# Patient Record
Sex: MALE | Race: WHITE | NOT HISPANIC OR LATINO | URBAN - METROPOLITAN AREA
[De-identification: names, ages, dates, MRNs, and addresses within clinical notes are randomized per-mention and may not be internally consistent; named-entity substitution may affect disease eponyms.]

---

## 2023-10-02 ENCOUNTER — TELEPHONE (OUTPATIENT)
Dept: PRIMARY CARE | Facility: CLINIC | Age: 68
End: 2023-10-02
Payer: MEDICARE

## 2023-10-02 DIAGNOSIS — Z00.00 HEALTHCARE MAINTENANCE: Primary | ICD-10-CM

## 2023-10-03 NOTE — TELEPHONE ENCOUNTER
Called and ANTONIETTA for jitendra. I also contacted the lab department for an estimate price for the order that was placed, they mentioned it could be about $20 in total but if it was the full blood type screening it'll be more expensive

## 2023-10-11 ENCOUNTER — LAB (OUTPATIENT)
Dept: LAB | Facility: LAB | Age: 68
End: 2023-10-11
Payer: MEDICARE

## 2023-10-11 DIAGNOSIS — Z00.00 HEALTHCARE MAINTENANCE: ICD-10-CM

## 2023-10-11 LAB
ABO GROUP (TYPE) IN BLOOD: NORMAL
RH FACTOR (ANTIGEN D): NORMAL

## 2023-10-11 PROCEDURE — 86901 BLOOD TYPING SEROLOGIC RH(D): CPT

## 2023-10-11 PROCEDURE — 86900 BLOOD TYPING SEROLOGIC ABO: CPT

## 2023-10-11 PROCEDURE — 36415 COLL VENOUS BLD VENIPUNCTURE: CPT

## 2023-12-20 DIAGNOSIS — L30.9 DERMATITIS, UNSPECIFIED: ICD-10-CM

## 2023-12-20 PROBLEM — R79.89 ABNORMAL THYROID BLOOD TEST: Status: ACTIVE | Noted: 2023-12-20

## 2023-12-20 PROBLEM — E66.3 OVERWEIGHT (BMI 25.0-29.9): Status: ACTIVE | Noted: 2023-12-20

## 2023-12-20 PROBLEM — L25.9 CONTACT DERMATITIS: Status: ACTIVE | Noted: 2023-12-20

## 2023-12-20 PROBLEM — B02.9 SHINGLES: Status: ACTIVE | Noted: 2023-12-20

## 2023-12-20 PROBLEM — E78.00 ELEVATED LDL CHOLESTEROL LEVEL: Status: ACTIVE | Noted: 2023-12-20

## 2023-12-20 PROBLEM — K21.9 GASTROESOPHAGEAL REFLUX DISEASE: Status: ACTIVE | Noted: 2023-12-20

## 2023-12-20 PROBLEM — Z20.822 SUSPECTED COVID-19 VIRUS INFECTION: Status: ACTIVE | Noted: 2023-12-20

## 2023-12-20 PROBLEM — I65.29 OCCLUSION AND STENOSIS OF UNSPECIFIED CAROTID ARTERY: Status: ACTIVE | Noted: 2023-12-20

## 2023-12-20 PROBLEM — R21 LOCALIZED RASH: Status: ACTIVE | Noted: 2023-12-20

## 2023-12-20 PROBLEM — I25.10 CAD (CORONARY ARTERY DISEASE): Status: ACTIVE | Noted: 2023-12-20

## 2023-12-20 PROBLEM — R73.9 HYPERGLYCEMIA: Status: ACTIVE | Noted: 2023-12-20

## 2023-12-20 PROBLEM — R97.20 ELEVATED PSA: Status: ACTIVE | Noted: 2023-12-20

## 2023-12-20 PROBLEM — F17.210 NICOTINE DEPENDENCE, CIGARETTES, UNCOMPLICATED: Status: ACTIVE | Noted: 2023-12-20

## 2023-12-20 PROBLEM — N40.0 BPH (BENIGN PROSTATIC HYPERPLASIA): Status: ACTIVE | Noted: 2023-12-20

## 2023-12-20 PROBLEM — R11.2 NAUSEA WITH VOMITING: Status: ACTIVE | Noted: 2023-12-20

## 2023-12-20 RX ORDER — DOXAZOSIN 4 MG/1
TABLET ORAL
COMMUNITY
Start: 2019-10-20 | End: 2024-01-03 | Stop reason: SDUPTHER

## 2023-12-20 RX ORDER — DICYCLOMINE HYDROCHLORIDE 20 MG/1
TABLET ORAL 3 TIMES DAILY PRN
COMMUNITY
Start: 2020-05-12 | End: 2024-01-11

## 2023-12-20 RX ORDER — SIMVASTATIN 80 MG/1
TABLET, FILM COATED ORAL
COMMUNITY
Start: 2019-11-16 | End: 2024-01-03 | Stop reason: SDUPTHER

## 2023-12-20 RX ORDER — ASPIRIN 81 MG/1
TABLET ORAL
COMMUNITY

## 2023-12-20 RX ORDER — DESOXIMETASONE 2.5 MG/G
1 CREAM TOPICAL 2 TIMES DAILY
COMMUNITY
End: 2023-12-22 | Stop reason: WASHOUT

## 2023-12-20 RX ORDER — ONDANSETRON 4 MG/1
TABLET, ORALLY DISINTEGRATING ORAL EVERY 6 HOURS PRN
COMMUNITY
Start: 2020-05-12 | End: 2024-01-11

## 2023-12-20 RX ORDER — TRIAMCINOLONE ACETONIDE 1 MG/G
CREAM TOPICAL
COMMUNITY
End: 2024-01-11

## 2023-12-22 RX ORDER — DESOXIMETASONE 2.5 MG/G
1 CREAM TOPICAL 2 TIMES DAILY
Qty: 60 G | Refills: 2 | Status: SHIPPED | OUTPATIENT
Start: 2023-12-22 | End: 2024-01-11 | Stop reason: WASHOUT

## 2024-01-03 DIAGNOSIS — N40.1 BENIGN PROSTATIC HYPERPLASIA WITH LOWER URINARY TRACT SYMPTOMS, SYMPTOM DETAILS UNSPECIFIED: ICD-10-CM

## 2024-01-03 DIAGNOSIS — E78.00 ELEVATED LDL CHOLESTEROL LEVEL: Primary | ICD-10-CM

## 2024-01-03 RX ORDER — SIMVASTATIN 80 MG/1
80 TABLET, FILM COATED ORAL DAILY
Qty: 90 TABLET | Refills: 3 | Status: SHIPPED | OUTPATIENT
Start: 2024-01-03 | End: 2025-01-02

## 2024-01-03 RX ORDER — DOXAZOSIN 4 MG/1
4 TABLET ORAL DAILY
Qty: 90 TABLET | Refills: 3 | Status: SHIPPED | OUTPATIENT
Start: 2024-01-03 | End: 2025-01-02

## 2024-01-11 ENCOUNTER — LAB (OUTPATIENT)
Dept: LAB | Facility: LAB | Age: 69
End: 2024-01-11
Payer: MEDICARE

## 2024-01-11 ENCOUNTER — OFFICE VISIT (OUTPATIENT)
Dept: PRIMARY CARE | Facility: CLINIC | Age: 69
End: 2024-01-11
Payer: MEDICARE

## 2024-01-11 VITALS
BODY MASS INDEX: 28.12 KG/M2 | SYSTOLIC BLOOD PRESSURE: 132 MMHG | OXYGEN SATURATION: 95 % | HEART RATE: 71 BPM | WEIGHT: 201.6 LBS | DIASTOLIC BLOOD PRESSURE: 74 MMHG

## 2024-01-11 DIAGNOSIS — Z12.5 PROSTATE CANCER SCREENING: ICD-10-CM

## 2024-01-11 DIAGNOSIS — Z11.59 ENCOUNTER FOR HEPATITIS C SCREENING TEST FOR LOW RISK PATIENT: ICD-10-CM

## 2024-01-11 DIAGNOSIS — E78.5 HYPERLIPIDEMIA, UNSPECIFIED HYPERLIPIDEMIA TYPE: ICD-10-CM

## 2024-01-11 DIAGNOSIS — Z00.00 ANNUAL PHYSICAL EXAM: Primary | ICD-10-CM

## 2024-01-11 DIAGNOSIS — G47.00 INSOMNIA, UNSPECIFIED TYPE: ICD-10-CM

## 2024-01-11 DIAGNOSIS — Z87.891 FORMER SMOKER: ICD-10-CM

## 2024-01-11 DIAGNOSIS — Z00.00 ANNUAL PHYSICAL EXAM: ICD-10-CM

## 2024-01-11 DIAGNOSIS — Z79.899 HIGH RISK MEDICATION USE: ICD-10-CM

## 2024-01-11 LAB
ALBUMIN SERPL BCP-MCNC: 4.5 G/DL (ref 3.4–5)
ALP SERPL-CCNC: 71 U/L (ref 33–136)
ALT SERPL W P-5'-P-CCNC: 20 U/L (ref 10–52)
ANION GAP SERPL CALC-SCNC: 11 MMOL/L (ref 10–20)
APPEARANCE UR: CLEAR
AST SERPL W P-5'-P-CCNC: 16 U/L (ref 9–39)
BILIRUB SERPL-MCNC: 0.7 MG/DL (ref 0–1.2)
BILIRUB UR STRIP.AUTO-MCNC: NEGATIVE MG/DL
BUN SERPL-MCNC: 14 MG/DL (ref 6–23)
CALCIUM SERPL-MCNC: 10.3 MG/DL (ref 8.6–10.6)
CHLORIDE SERPL-SCNC: 102 MMOL/L (ref 98–107)
CHOLEST SERPL-MCNC: 114 MG/DL (ref 0–199)
CHOLESTEROL/HDL RATIO: 2
CO2 SERPL-SCNC: 31 MMOL/L (ref 21–32)
COLOR UR: YELLOW
CREAT SERPL-MCNC: 0.82 MG/DL (ref 0.5–1.3)
EGFRCR SERPLBLD CKD-EPI 2021: >90 ML/MIN/1.73M*2
ERYTHROCYTE [DISTWIDTH] IN BLOOD BY AUTOMATED COUNT: 12.9 % (ref 11.5–14.5)
EST. AVERAGE GLUCOSE BLD GHB EST-MCNC: 114 MG/DL
GLUCOSE SERPL-MCNC: 108 MG/DL (ref 74–99)
GLUCOSE UR STRIP.AUTO-MCNC: NEGATIVE MG/DL
HBA1C MFR BLD: 5.6 %
HCT VFR BLD AUTO: 44.7 % (ref 41–52)
HCV AB SER QL: NONREACTIVE
HDLC SERPL-MCNC: 56.4 MG/DL
HGB BLD-MCNC: 15 G/DL (ref 13.5–17.5)
KETONES UR STRIP.AUTO-MCNC: NEGATIVE MG/DL
LDLC SERPL CALC-MCNC: 46 MG/DL
LEUKOCYTE ESTERASE UR QL STRIP.AUTO: NEGATIVE
MCH RBC QN AUTO: 30.9 PG (ref 26–34)
MCHC RBC AUTO-ENTMCNC: 33.6 G/DL (ref 32–36)
MCV RBC AUTO: 92 FL (ref 80–100)
NITRITE UR QL STRIP.AUTO: NEGATIVE
NON HDL CHOLESTEROL: 58 MG/DL (ref 0–149)
NRBC BLD-RTO: 0 /100 WBCS (ref 0–0)
PH UR STRIP.AUTO: 7 [PH]
PLATELET # BLD AUTO: 213 X10*3/UL (ref 150–450)
POTASSIUM SERPL-SCNC: 4 MMOL/L (ref 3.5–5.3)
PROT SERPL-MCNC: 7.6 G/DL (ref 6.4–8.2)
PROT UR STRIP.AUTO-MCNC: NEGATIVE MG/DL
PSA SERPL-MCNC: 4.38 NG/ML
RBC # BLD AUTO: 4.86 X10*6/UL (ref 4.5–5.9)
RBC # UR STRIP.AUTO: NEGATIVE /UL
SODIUM SERPL-SCNC: 140 MMOL/L (ref 136–145)
SP GR UR STRIP.AUTO: 1.01
TRIGL SERPL-MCNC: 60 MG/DL (ref 0–149)
UROBILINOGEN UR STRIP.AUTO-MCNC: <2 MG/DL
VLDL: 12 MG/DL (ref 0–40)
WBC # BLD AUTO: 5 X10*3/UL (ref 4.4–11.3)

## 2024-01-11 PROCEDURE — 80061 LIPID PANEL: CPT

## 2024-01-11 PROCEDURE — 1159F MED LIST DOCD IN RCRD: CPT | Performed by: FAMILY MEDICINE

## 2024-01-11 PROCEDURE — 99213 OFFICE O/P EST LOW 20 MIN: CPT | Performed by: FAMILY MEDICINE

## 2024-01-11 PROCEDURE — 83036 HEMOGLOBIN GLYCOSYLATED A1C: CPT

## 2024-01-11 PROCEDURE — G0439 PPPS, SUBSEQ VISIT: HCPCS | Performed by: FAMILY MEDICINE

## 2024-01-11 PROCEDURE — 80053 COMPREHEN METABOLIC PANEL: CPT

## 2024-01-11 PROCEDURE — G0103 PSA SCREENING: HCPCS

## 2024-01-11 PROCEDURE — 1170F FXNL STATUS ASSESSED: CPT | Performed by: FAMILY MEDICINE

## 2024-01-11 PROCEDURE — 86803 HEPATITIS C AB TEST: CPT

## 2024-01-11 PROCEDURE — 1126F AMNT PAIN NOTED NONE PRSNT: CPT | Performed by: FAMILY MEDICINE

## 2024-01-11 PROCEDURE — 85027 COMPLETE CBC AUTOMATED: CPT

## 2024-01-11 PROCEDURE — 81003 URINALYSIS AUTO W/O SCOPE: CPT

## 2024-01-11 PROCEDURE — 36415 COLL VENOUS BLD VENIPUNCTURE: CPT

## 2024-01-11 RX ORDER — AMITRIPTYLINE HYDROCHLORIDE 10 MG/1
10-20 TABLET, FILM COATED ORAL NIGHTLY
Qty: 60 TABLET | Refills: 11 | Status: SHIPPED | OUTPATIENT
Start: 2024-01-11 | End: 2025-01-10

## 2024-01-11 RX ORDER — HYDROCORTISONE 25 MG/G
CREAM TOPICAL 2 TIMES DAILY
COMMUNITY

## 2024-01-11 ASSESSMENT — PATIENT HEALTH QUESTIONNAIRE - PHQ9
1. LITTLE INTEREST OR PLEASURE IN DOING THINGS: NOT AT ALL
2. FEELING DOWN, DEPRESSED OR HOPELESS: NOT AT ALL
SUM OF ALL RESPONSES TO PHQ9 QUESTIONS 1 AND 2: 0

## 2024-01-11 ASSESSMENT — ACTIVITIES OF DAILY LIVING (ADL)
MANAGING_FINANCES: INDEPENDENT
DOING_HOUSEWORK: INDEPENDENT
TAKING_MEDICATION: INDEPENDENT
GROCERY_SHOPPING: INDEPENDENT
BATHING: INDEPENDENT
DRESSING: INDEPENDENT

## 2024-01-11 ASSESSMENT — ENCOUNTER SYMPTOMS
DEPRESSION: 0
OCCASIONAL FEELINGS OF UNSTEADINESS: 0
LOSS OF SENSATION IN FEET: 0

## 2024-01-11 ASSESSMENT — PAIN SCALES - GENERAL: PAINLEVEL: 0-NO PAIN

## 2024-01-11 NOTE — PROGRESS NOTES
Subjective   Patient ID: Bebo Casillas is a 68 y.o. male who presents for Medicare Annual Wellness Visit Subsequent (Medicare wellness exam, pt is fasting. ) and Annual Exam (Annual physical exam. ).    HPI   Patient here for annual checkup.  He is doing well.  He is exercising regularly.  No complaints other than wants to discuss knee pain.  He is using 2 Benadryl at night with pretty good success.      Review of Systems    Objective   /74 (BP Location: Left arm, Patient Position: Sitting, BP Cuff Size: Adult)   Pulse 71   Wt 91.4 kg (201 lb 9.6 oz)   SpO2 95%   BMI 28.12 kg/m²     Physical Exam  Alert, pleasant and in no acute distress.  Neck: Supple, no JVD or carotid bruit  Heart: Regular rate and rhythm, no murmur  Lungs: Clear to auscultation  Lower extremities: No edema  Abdomen: Soft, nontender without palpable mass    Assessment/Plan   Problem List Items Addressed This Visit    None  Visit Diagnoses         Codes    Annual physical exam    -  Primary Z00.00    Relevant Orders    Hepatitis C antibody    Comprehensive Metabolic Panel    CBC    Lipid Panel    Prostate Specific Antigen, Screen    Urinalysis with Reflex Microscopic    Hemoglobin A1C    Insomnia, unspecified type     G47.00    Relevant Medications    amitriptyline (Elavil) 10 mg tablet    Hyperlipidemia, unspecified hyperlipidemia type     E78.5    Relevant Orders    Lipid Panel    Former smoker     Z87.891    Relevant Orders    CT lung screening low dose    High risk medication use     Z79.899    Relevant Orders    Comprehensive Metabolic Panel    CBC    Prostate cancer screening     Z12.5    Relevant Orders    Prostate Specific Antigen, Screen    Encounter for hepatitis C screening test for low risk patient     Z11.59    Relevant Orders    Hepatitis C antibody        1.  Health maintenance: Care gaps addressed.  Hepatitis C and PSA screenings.  Hemoglobin A1c (elevated blood sugar on past chemistries).  Discussed healthy diet and  avoiding high carbs  2.  Insomnia: Will trial amitriptyline at night for sleep.  Can also consider melatonin if this is not working well.  3.  Hyperlipidemia: Will recheck lipid panel and routine labs  4.  Former smoker: Low-dose CT ordered  Will contact patient in 3 to 5 days with results.

## 2024-01-14 DIAGNOSIS — R97.20 ELEVATED PSA: Primary | ICD-10-CM

## 2024-01-17 ENCOUNTER — ANCILLARY PROCEDURE (OUTPATIENT)
Dept: RADIOLOGY | Facility: CLINIC | Age: 69
End: 2024-01-17
Payer: MEDICARE

## 2024-01-17 DIAGNOSIS — Z87.891 FORMER SMOKER: ICD-10-CM

## 2024-01-17 PROCEDURE — 71271 CT THORAX LUNG CANCER SCR C-: CPT

## 2024-01-17 PROCEDURE — 71271 CT THORAX LUNG CANCER SCR C-: CPT | Performed by: RADIOLOGY

## 2024-07-17 DIAGNOSIS — L30.9 ECZEMA, UNSPECIFIED TYPE: Primary | ICD-10-CM

## 2024-07-18 RX ORDER — HYDROCORTISONE 25 MG/G
CREAM TOPICAL 2 TIMES DAILY
Qty: 30 G | Refills: 3 | Status: SHIPPED | OUTPATIENT
Start: 2024-07-18

## 2024-07-24 ENCOUNTER — LAB (OUTPATIENT)
Dept: LAB | Facility: LAB | Age: 69
End: 2024-07-24
Payer: MEDICARE

## 2024-07-24 DIAGNOSIS — R97.20 ELEVATED PSA: ICD-10-CM

## 2024-07-24 LAB — PSA SERPL-MCNC: 4.78 NG/ML

## 2024-07-24 PROCEDURE — 84153 ASSAY OF PSA TOTAL: CPT

## 2024-07-24 PROCEDURE — 36415 COLL VENOUS BLD VENIPUNCTURE: CPT

## 2024-10-26 ENCOUNTER — TELEPHONE (OUTPATIENT)
Dept: PRIMARY CARE | Facility: CLINIC | Age: 69
End: 2024-10-26
Payer: MEDICARE

## 2024-10-26 DIAGNOSIS — J01.90 ACUTE NON-RECURRENT SINUSITIS, UNSPECIFIED LOCATION: Primary | ICD-10-CM

## 2024-10-26 RX ORDER — AZITHROMYCIN 250 MG/1
TABLET, FILM COATED ORAL
Qty: 6 TABLET | Refills: 0 | Status: SHIPPED | OUTPATIENT
Start: 2024-10-26

## 2024-12-11 DIAGNOSIS — U07.1 COVID-19: Primary | ICD-10-CM

## 2024-12-11 RX ORDER — NIRMATRELVIR AND RITONAVIR 300-100 MG
3 KIT ORAL 2 TIMES DAILY
Qty: 30 TABLET | Refills: 0 | Status: SHIPPED | OUTPATIENT
Start: 2024-12-11 | End: 2024-12-16

## 2024-12-14 DIAGNOSIS — N40.1 BENIGN PROSTATIC HYPERPLASIA WITH LOWER URINARY TRACT SYMPTOMS, SYMPTOM DETAILS UNSPECIFIED: ICD-10-CM

## 2024-12-14 DIAGNOSIS — E78.00 ELEVATED LDL CHOLESTEROL LEVEL: ICD-10-CM

## 2024-12-16 RX ORDER — SIMVASTATIN 80 MG/1
80 TABLET, FILM COATED ORAL DAILY
Qty: 90 TABLET | Refills: 1 | Status: SHIPPED | OUTPATIENT
Start: 2024-12-16

## 2024-12-16 RX ORDER — DOXAZOSIN 4 MG/1
4 TABLET ORAL DAILY
Qty: 90 TABLET | Refills: 1 | Status: SHIPPED | OUTPATIENT
Start: 2024-12-16

## 2025-01-24 ENCOUNTER — APPOINTMENT (OUTPATIENT)
Dept: PRIMARY CARE | Facility: CLINIC | Age: 70
End: 2025-01-24
Payer: MEDICARE

## 2025-02-04 ENCOUNTER — APPOINTMENT (OUTPATIENT)
Dept: PRIMARY CARE | Facility: CLINIC | Age: 70
End: 2025-02-04
Payer: MEDICARE

## 2025-02-04 VITALS
HEIGHT: 69 IN | DIASTOLIC BLOOD PRESSURE: 71 MMHG | HEART RATE: 74 BPM | WEIGHT: 192.1 LBS | RESPIRATION RATE: 18 BRPM | OXYGEN SATURATION: 96 % | BODY MASS INDEX: 28.45 KG/M2 | SYSTOLIC BLOOD PRESSURE: 127 MMHG

## 2025-02-04 DIAGNOSIS — E78.5 DYSLIPIDEMIA: ICD-10-CM

## 2025-02-04 DIAGNOSIS — F51.01 PRIMARY INSOMNIA: ICD-10-CM

## 2025-02-04 DIAGNOSIS — F17.211 NICOTINE DEPENDENCE, CIGARETTES, IN REMISSION: ICD-10-CM

## 2025-02-04 DIAGNOSIS — Z71.89 GOALS OF CARE, COUNSELING/DISCUSSION: ICD-10-CM

## 2025-02-04 DIAGNOSIS — Z00.00 MEDICARE ANNUAL WELLNESS VISIT, SUBSEQUENT: Primary | ICD-10-CM

## 2025-02-04 DIAGNOSIS — R97.20 ELEVATED PSA: ICD-10-CM

## 2025-02-04 PROCEDURE — 1123F ACP DISCUSS/DSCN MKR DOCD: CPT | Performed by: INTERNAL MEDICINE

## 2025-02-04 PROCEDURE — 1158F ADVNC CARE PLAN TLK DOCD: CPT | Performed by: INTERNAL MEDICINE

## 2025-02-04 PROCEDURE — 3008F BODY MASS INDEX DOCD: CPT | Performed by: INTERNAL MEDICINE

## 2025-02-04 PROCEDURE — G0439 PPPS, SUBSEQ VISIT: HCPCS | Performed by: INTERNAL MEDICINE

## 2025-02-04 PROCEDURE — 1170F FXNL STATUS ASSESSED: CPT | Performed by: INTERNAL MEDICINE

## 2025-02-04 PROCEDURE — 99214 OFFICE O/P EST MOD 30 MIN: CPT | Performed by: INTERNAL MEDICINE

## 2025-02-04 PROCEDURE — 1159F MED LIST DOCD IN RCRD: CPT | Performed by: INTERNAL MEDICINE

## 2025-02-04 RX ORDER — OXYCODONE AND ACETAMINOPHEN 5; 325 MG/1; MG/1
1 TABLET ORAL AS NEEDED
COMMUNITY
Start: 2020-05-27 | End: 2025-02-04 | Stop reason: WASHOUT

## 2025-02-04 RX ORDER — GABAPENTIN 100 MG/1
100 CAPSULE ORAL AS NEEDED
COMMUNITY
Start: 2020-05-20 | End: 2025-02-04 | Stop reason: WASHOUT

## 2025-02-04 RX ORDER — TRAMADOL HYDROCHLORIDE 50 MG/1
50 TABLET ORAL EVERY 8 HOURS PRN
COMMUNITY
Start: 2020-06-04 | End: 2025-02-04 | Stop reason: WASHOUT

## 2025-02-04 ASSESSMENT — ENCOUNTER SYMPTOMS
SLEEP DISTURBANCE: 0
LOSS OF SENSATION IN FEET: 0
BLOOD IN STOOL: 1
HEADACHES: 0
SHORTNESS OF BREATH: 0
DIZZINESS: 0
CONSTIPATION: 0
DEPRESSION: 0
OCCASIONAL FEELINGS OF UNSTEADINESS: 0
FATIGUE: 0

## 2025-02-04 ASSESSMENT — ACTIVITIES OF DAILY LIVING (ADL)
MANAGING_FINANCES: INDEPENDENT
BATHING: INDEPENDENT
DRESSING: INDEPENDENT
DOING_HOUSEWORK: INDEPENDENT
GROCERY_SHOPPING: INDEPENDENT
TAKING_MEDICATION: INDEPENDENT

## 2025-02-04 NOTE — ASSESSMENT & PLAN NOTE
-Has been persistently elevated. Has BPH w/ nocturia and takes cardura for this. Symptoms haven't changed recently. Will recheck PSA.  Orders:    PSA, total and free; Future

## 2025-02-04 NOTE — PATIENT INSTRUCTIONS
Please remember you need to be fasting when you obtain your labwork. That means nothing to eat or drink for 8-12 hours (water or black coffee are OK though).

## 2025-02-04 NOTE — PROGRESS NOTES
"Subjective   Reason for Visit: Chandana Casillas is an 69 y.o. male here for a Medicare Wellness visit.          Reviewed all medications by prescribing practitioner or clinical pharmacist (such as prescriptions, OTCs, herbal therapies and supplements) and documented in the medical record.    Pt presents to get established from Dr. Martinez's office and for a MAW.    PMH:  -HLD: Taking simvastatin 80mg daily and ASA.  -BPH w/ nocturia: Takes cardura which helps. Feels like drinking a lot of water still makes him get up 1-2x a night.  -Insomnia: Takes 2 benadryl every night to help with sleep. Tried amitriptyline a year ago but it didn't work as well.    Just had COVID in December. Took paxlovid.    Is semi-retired but still works selling homes. Sleeps 7-8 hours a night. Tries to walk 8-12k steps a day. Will do 45 mins on a bowflex 5-6 days a week.      Patient Care Team:  Cali Menendez MD as PCP - General (Internal Medicine)  Timo Martinez DO as PCP - [facility]ributed [facility]     Pt is not considered to be at high risk of opioid abuse after reviewing chart.    Review of Systems   Constitutional:  Negative for fatigue.   Respiratory:  Negative for shortness of breath.    Cardiovascular:  Negative for chest pain.   Gastrointestinal:  Positive for blood in stool (From hemorrhoids. Last occurred a few weeks ago when pt had hot sauce). Negative for constipation.   Neurological:  Negative for dizziness and headaches.   Psychiatric/Behavioral:  Negative for sleep disturbance.        Objective   Vitals:  /71 (BP Location: Right arm, Patient Position: Sitting)   Pulse 74   Resp 18   Ht 1.753 m (5' 9\")   Wt 87.1 kg (192 lb 1.6 oz)   SpO2 96%   BMI 28.37 kg/m²       Physical Exam  Constitutional:       General: He is not in acute distress.     Appearance: He is not ill-appearing, toxic-appearing or diaphoretic.   HENT:      Head: Normocephalic and atraumatic.   Eyes:      General: No scleral icterus.     " Conjunctiva/sclera: Conjunctivae normal.   Cardiovascular:      Rate and Rhythm: Normal rate and regular rhythm.      Heart sounds: No murmur heard.     No friction rub. No gallop.   Pulmonary:      Effort: Pulmonary effort is normal. No respiratory distress.      Breath sounds: No stridor. No wheezing, rhonchi or rales.   Abdominal:      General: Abdomen is flat. Bowel sounds are normal. There is no distension.      Palpations: Abdomen is soft.      Tenderness: There is no abdominal tenderness. There is no guarding.   Musculoskeletal:      Cervical back: Normal range of motion and neck supple. No tenderness.   Lymphadenopathy:      Cervical: No cervical adenopathy.   Skin:     General: Skin is warm and dry.   Neurological:      Mental Status: He is alert.         Assessment/Plan   Assessment & Plan  Medicare annual wellness visit, subsequent         Goals of care, counseling/discussion         Dyslipidemia  -Pt's numbers well controlled on simvastatin 80mg daily and ASA. Will continue and recheck lvl.  Orders:    Comprehensive metabolic panel; Future    CBC; Future    Lipid panel; Future    Tsh With Reflex To Free T4 If Abnormal; Future    Elevated PSA  -Has been persistently elevated. Has BPH w/ nocturia and takes cardura for this. Symptoms haven't changed recently. Will recheck PSA.  Orders:    PSA, total and free; Future    Nicotine dependence, cigarettes, in remission  -Pt last had a CT done 1 year ago w/ lung nodule noted. Needs to repeat now. Order placed.  Orders:    CT lung screening low dose; Future    Primary insomnia  -Has had difficulty sleeping for years. Tried amitriptyline a year ago but it didn't work well so instead takes 2 benadryls a night. I reviewed w/ him the concern for possibly increased risk of dementia by doing this and encouraged him to try a different regimen. Discussed melatonin vs trazodone. Pt would rather start with melatonin. Reviewed when he needs to take this. Pt to try it and see  if it helps. If not, he is encouraged to follow up to discuss further.        -Discussed recommendation for flu shot and shingles. Pt states a few years ago he got his flu shot, pneumonia shot, and TDAP. Is concerned that his shingles outbreak afterwards was related to this, so he since has declined to get repeat flu shots. Pt is interested in getting shingles vaccine. Cautioned on how he may react and advised to get going into a weekend with minimal plans.    Advance Directives Discussion  Advanced Care Planning (including a Living Will, Healthcare POA, as well as specific end of life choices and/or directives), was discussed with the patient and/or surrogate, voluntarily, and details of that discussion documented in the Problem List (under Advanced Directives Discussion) of the medical record.  Pt doesn't have a living will or HCPOA. Confirms he wants to be full code.   (~16 min spent discussing above)

## 2025-02-05 DIAGNOSIS — R97.20 ELEVATED PSA: Primary | ICD-10-CM

## 2025-02-05 LAB
ALBUMIN SERPL-MCNC: 4.7 G/DL (ref 3.6–5.1)
ALP SERPL-CCNC: 81 U/L (ref 35–144)
ALT SERPL-CCNC: 19 U/L (ref 9–46)
ANION GAP SERPL CALCULATED.4IONS-SCNC: 11 MMOL/L (CALC) (ref 7–17)
AST SERPL-CCNC: 17 U/L (ref 10–35)
BILIRUB SERPL-MCNC: 0.7 MG/DL (ref 0.2–1.2)
BUN SERPL-MCNC: 14 MG/DL (ref 7–25)
CALCIUM SERPL-MCNC: 9.7 MG/DL (ref 8.6–10.3)
CHLORIDE SERPL-SCNC: 101 MMOL/L (ref 98–110)
CHOLEST SERPL-MCNC: 111 MG/DL
CHOLEST/HDLC SERPL: 2.1 (CALC)
CO2 SERPL-SCNC: 27 MMOL/L (ref 20–32)
CREAT SERPL-MCNC: 0.87 MG/DL (ref 0.7–1.28)
EGFRCR SERPLBLD CKD-EPI 2021: 92 ML/MIN/1.73M2
ERYTHROCYTE [DISTWIDTH] IN BLOOD BY AUTOMATED COUNT: 12.9 % (ref 11–15)
GLUCOSE SERPL-MCNC: 111 MG/DL (ref 65–99)
HCT VFR BLD AUTO: 45.9 % (ref 38.5–50)
HDLC SERPL-MCNC: 52 MG/DL
HGB BLD-MCNC: 15.1 G/DL (ref 13.2–17.1)
LDLC SERPL CALC-MCNC: 45 MG/DL (CALC)
MCH RBC QN AUTO: 29.9 PG (ref 27–33)
MCHC RBC AUTO-ENTMCNC: 32.9 G/DL (ref 32–36)
MCV RBC AUTO: 90.9 FL (ref 80–100)
NONHDLC SERPL-MCNC: 59 MG/DL (CALC)
PLATELET # BLD AUTO: 228 THOUSAND/UL (ref 140–400)
PMV BLD REES-ECKER: 10.1 FL (ref 7.5–12.5)
POTASSIUM SERPL-SCNC: 4.1 MMOL/L (ref 3.5–5.3)
PROT SERPL-MCNC: 7.6 G/DL (ref 6.1–8.1)
PSA FREE MFR SERPL: 23 % (CALC)
PSA FREE SERPL-MCNC: 1.6 NG/ML
PSA SERPL-MCNC: 7.1 NG/ML
RBC # BLD AUTO: 5.05 MILLION/UL (ref 4.2–5.8)
SODIUM SERPL-SCNC: 139 MMOL/L (ref 135–146)
TRIGL SERPL-MCNC: 62 MG/DL
TSH SERPL-ACNC: 3.09 MIU/L (ref 0.4–4.5)
WBC # BLD AUTO: 5 THOUSAND/UL (ref 3.8–10.8)

## 2025-02-20 ENCOUNTER — HOSPITAL ENCOUNTER (OUTPATIENT)
Dept: RADIOLOGY | Facility: CLINIC | Age: 70
Discharge: HOME | End: 2025-02-20
Payer: MEDICARE

## 2025-02-20 DIAGNOSIS — R91.1 LUNG NODULE: Primary | ICD-10-CM

## 2025-02-20 DIAGNOSIS — F17.211 NICOTINE DEPENDENCE, CIGARETTES, IN REMISSION: ICD-10-CM

## 2025-02-20 PROCEDURE — 71271 CT THORAX LUNG CANCER SCR C-: CPT | Performed by: RADIOLOGY

## 2025-02-20 PROCEDURE — 71271 CT THORAX LUNG CANCER SCR C-: CPT

## 2025-02-21 PROBLEM — N40.0 BENIGN PROSTATIC HYPERPLASIA: Status: RESOLVED | Noted: 2025-02-21 | Resolved: 2025-02-21

## 2025-02-21 PROBLEM — R21 RASH: Status: RESOLVED | Noted: 2025-02-21 | Resolved: 2025-02-21

## 2025-02-21 PROBLEM — B02.9 HERPES ZOSTER: Status: RESOLVED | Noted: 2025-02-21 | Resolved: 2025-02-21

## 2025-02-21 PROBLEM — Z20.822 SUSPECTED SEVERE ACUTE RESPIRATORY SYNDROME CORONAVIRUS 2 (SARS-COV-2) INFECTION: Status: RESOLVED | Noted: 2025-02-21 | Resolved: 2025-02-21

## 2025-02-21 PROBLEM — J20.9 ACUTE BRONCHITIS: Status: ACTIVE | Noted: 2025-02-21

## 2025-02-21 PROBLEM — F17.200 NICOTINE DEPENDENCE: Status: ACTIVE | Noted: 2025-02-21

## 2025-02-21 PROBLEM — I65.29 OCCLUSION OF CAROTID ARTERY: Status: RESOLVED | Noted: 2025-02-21 | Resolved: 2025-02-21

## 2025-02-21 PROBLEM — E66.3 OVERWEIGHT WITH BODY MASS INDEX (BMI) 25.0-29.9: Status: RESOLVED | Noted: 2025-02-21 | Resolved: 2025-02-21

## 2025-02-21 PROBLEM — Z86.39 HISTORY OF HYPERCHOLESTEROLEMIA: Status: ACTIVE | Noted: 2025-02-21

## 2025-02-21 PROBLEM — E78.00 ELEVATED LOW DENSITY LIPOPROTEIN (LDL) CHOLESTEROL LEVEL: Status: RESOLVED | Noted: 2025-02-21 | Resolved: 2025-02-21

## 2025-02-21 PROBLEM — I25.10 ARTERIOSCLEROSIS OF CORONARY ARTERY: Status: RESOLVED | Noted: 2025-02-21 | Resolved: 2025-02-21

## 2025-02-21 PROBLEM — Z86.0100 HISTORY OF COLONIC POLYPS: Status: ACTIVE | Noted: 2025-02-21

## 2025-02-21 PROBLEM — R19.7 DIARRHEA: Status: ACTIVE | Noted: 2025-02-21

## 2025-02-21 PROBLEM — R11.2 NAUSEA AND VOMITING: Status: RESOLVED | Noted: 2025-02-21 | Resolved: 2025-02-21

## 2025-02-21 PROBLEM — J06.9 ACUTE UPPER RESPIRATORY INFECTION: Status: ACTIVE | Noted: 2025-02-21

## 2025-02-24 NOTE — PROGRESS NOTES
"Chad Casillas  is a 69 y.o. male who presents for evaluation of right middle lobe lung nodule.      This patient has been receiving CT screening for lung cancer. A CT scan performed on 1/17/2024 showed a groundglass opacity in the right middle lobe measuring 14 mm in size.  He received a follow-up scan on 2/20/2025 which showed a solid component measuring 7 mm with in this groundglass lesion.    Currently the patient is in their usual state of health. He denies the following symptoms: chest pain, shortness of breath at rest, shortness of breath with activity, cough, hemoptysis, fevers, chills, and weight loss.  He is active and can play 36 holes of golf and works out regularly    He  has a past medical history of Benign prostatic hyperplasia (02/21/2025), Elevated low density lipoprotein (LDL) cholesterol level (02/21/2025), Herpes zoster (02/21/2025), Overweight with body mass index (BMI) 25.0-29.9 (02/21/2025), Personal history of colonic polyps (01/05/2017), Personal history of other diseases of the circulatory system, and Personal history of other endocrine, nutritional and metabolic disease.  He  has a past surgical history that includes Cardiac catheterization.  He   Family History   Problem Relation Name Age of Onset    Colon cancer Mother          Diagnosed in her 80s.    Brainstem hemorrhage (Multi) Father      Hypertension Father         He  reports that he has been smoking cigars. He has never used smokeless tobacco. He reports current alcohol use of about 10.0 standard drinks of alcohol per week. He reports that he does not use drugs. He smokes ~3 cigars a week in summer. He was a \"pack a day\" years ago and quit >10 years ago.     Objective   Physical Exam  The patient is well-appearing and in no acute distress. The trachea is midline and there is no crepitus. The lungs were clear to auscultation grossly. There was good effort and excursion. The heart had a regular rate and rhythm. The abdomen " was soft, nontender and nondistended. The extremities had no edema or gross deformities. Mood and affect are appropriate.  Diagnostic Studies  I reviewed the test results described in the HPI    Assessment/Plan   I believe that the patient has a lung nodule of unclear etiology.     Based on the patient's clinical presentation and available radiographic imaging, I believe the lung nodule is concerning for a malignant process.  I am particularly suspicious for an early stage lung cancer. We discussed various management strategies including surgery, biopsy, and observation.  Based on this discussion, the patient elected for ongoing work up. I believe it is reasonable to start with PET and then choose a biopsy method based on this. Concurrent PFTs because likely surgery.     I recommend PET CT and pulmonary function testing (PFTs)    I discussed this in detail with the patient, including a discussion of alternatives. They were comfortable with this approach.     Junior Beckwith MD  [phone]

## 2025-02-26 ENCOUNTER — OFFICE VISIT (OUTPATIENT)
Dept: SURGERY | Facility: CLINIC | Age: 70
End: 2025-02-26
Payer: MEDICARE

## 2025-02-26 VITALS
HEART RATE: 85 BPM | BODY MASS INDEX: 26.82 KG/M2 | TEMPERATURE: 98.4 F | SYSTOLIC BLOOD PRESSURE: 140 MMHG | WEIGHT: 191.6 LBS | HEIGHT: 71 IN | OXYGEN SATURATION: 95 % | DIASTOLIC BLOOD PRESSURE: 72 MMHG

## 2025-02-26 DIAGNOSIS — R91.1 LUNG NODULE: ICD-10-CM

## 2025-02-26 DIAGNOSIS — C34.2 MALIGNANT NEOPLASM OF MIDDLE LOBE, BRONCHUS OR LUNG: ICD-10-CM

## 2025-02-26 DIAGNOSIS — R91.1 INCIDENTAL LUNG NODULE, GREATER THAN OR EQUAL TO 8MM: Primary | ICD-10-CM

## 2025-02-26 PROCEDURE — 1126F AMNT PAIN NOTED NONE PRSNT: CPT | Performed by: THORACIC SURGERY (CARDIOTHORACIC VASCULAR SURGERY)

## 2025-02-26 PROCEDURE — 3008F BODY MASS INDEX DOCD: CPT | Performed by: THORACIC SURGERY (CARDIOTHORACIC VASCULAR SURGERY)

## 2025-02-26 PROCEDURE — 1159F MED LIST DOCD IN RCRD: CPT | Performed by: THORACIC SURGERY (CARDIOTHORACIC VASCULAR SURGERY)

## 2025-02-26 PROCEDURE — 99205 OFFICE O/P NEW HI 60 MIN: CPT | Performed by: THORACIC SURGERY (CARDIOTHORACIC VASCULAR SURGERY)

## 2025-02-26 PROCEDURE — 99215 OFFICE O/P EST HI 40 MIN: CPT | Performed by: THORACIC SURGERY (CARDIOTHORACIC VASCULAR SURGERY)

## 2025-02-26 PROCEDURE — 1123F ACP DISCUSS/DSCN MKR DOCD: CPT | Performed by: THORACIC SURGERY (CARDIOTHORACIC VASCULAR SURGERY)

## 2025-02-26 RX ORDER — DIPHENHYDRAMINE HCL 25 MG
25 TABLET ORAL NIGHTLY
COMMUNITY

## 2025-02-26 RX ORDER — ASCORBIC ACID 500 MG
500 TABLET ORAL DAILY
COMMUNITY

## 2025-02-26 RX ORDER — MELATONIN 5 MG
1 CAPSULE ORAL NIGHTLY
COMMUNITY

## 2025-02-26 RX ORDER — LANOLIN ALCOHOL/MO/W.PET/CERES
1000 CREAM (GRAM) TOPICAL DAILY
COMMUNITY

## 2025-02-26 SDOH — ECONOMIC STABILITY: FOOD INSECURITY: WITHIN THE PAST 12 MONTHS, THE FOOD YOU BOUGHT JUST DIDN'T LAST AND YOU DIDN'T HAVE MONEY TO GET MORE.: NEVER TRUE

## 2025-02-26 SDOH — ECONOMIC STABILITY: FOOD INSECURITY: WITHIN THE PAST 12 MONTHS, YOU WORRIED THAT YOUR FOOD WOULD RUN OUT BEFORE YOU GOT MONEY TO BUY MORE.: NEVER TRUE

## 2025-02-26 ASSESSMENT — ENCOUNTER SYMPTOMS
LOSS OF SENSATION IN FEET: 0
DEPRESSION: 0
OCCASIONAL FEELINGS OF UNSTEADINESS: 0

## 2025-02-26 ASSESSMENT — PATIENT HEALTH QUESTIONNAIRE - PHQ9
SUM OF ALL RESPONSES TO PHQ9 QUESTIONS 1 AND 2: 0
2. FEELING DOWN, DEPRESSED OR HOPELESS: NOT AT ALL
1. LITTLE INTEREST OR PLEASURE IN DOING THINGS: NOT AT ALL

## 2025-02-26 ASSESSMENT — LIFESTYLE VARIABLES
AUDIT-C TOTAL SCORE: 4
HOW OFTEN DO YOU HAVE A DRINK CONTAINING ALCOHOL: 4 OR MORE TIMES A WEEK
HOW OFTEN DO YOU HAVE SIX OR MORE DRINKS ON ONE OCCASION: NEVER
HOW MANY STANDARD DRINKS CONTAINING ALCOHOL DO YOU HAVE ON A TYPICAL DAY: 1 OR 2
SKIP TO QUESTIONS 9-10: 1

## 2025-02-26 ASSESSMENT — COLUMBIA-SUICIDE SEVERITY RATING SCALE - C-SSRS
6. HAVE YOU EVER DONE ANYTHING, STARTED TO DO ANYTHING, OR PREPARED TO DO ANYTHING TO END YOUR LIFE?: NO
1. IN THE PAST MONTH, HAVE YOU WISHED YOU WERE DEAD OR WISHED YOU COULD GO TO SLEEP AND NOT WAKE UP?: NO
2. HAVE YOU ACTUALLY HAD ANY THOUGHTS OF KILLING YOURSELF?: NO

## 2025-02-26 ASSESSMENT — PAIN SCALES - GENERAL: PAINLEVEL_OUTOF10: 0-NO PAIN

## 2025-03-03 ENCOUNTER — HOSPITAL ENCOUNTER (OUTPATIENT)
Dept: RESPIRATORY THERAPY | Facility: HOSPITAL | Age: 70
Discharge: HOME | End: 2025-03-03
Payer: MEDICARE

## 2025-03-03 DIAGNOSIS — R91.1 INCIDENTAL LUNG NODULE, GREATER THAN OR EQUAL TO 8MM: ICD-10-CM

## 2025-03-03 PROCEDURE — 94726 PLETHYSMOGRAPHY LUNG VOLUMES: CPT

## 2025-03-04 ENCOUNTER — HOSPITAL ENCOUNTER (OUTPATIENT)
Dept: RADIOLOGY | Facility: CLINIC | Age: 70
Discharge: HOME | End: 2025-03-04
Payer: MEDICARE

## 2025-03-04 DIAGNOSIS — C34.2 MALIGNANT NEOPLASM OF MIDDLE LOBE, BRONCHUS OR LUNG: ICD-10-CM

## 2025-03-04 DIAGNOSIS — R91.1 INCIDENTAL LUNG NODULE, GREATER THAN OR EQUAL TO 8MM: ICD-10-CM

## 2025-03-04 LAB
GLUCOSE BLD MANUAL STRIP-MCNC: 102 MG/DL (ref 74–99)
MGC ASCENT PFT - FEV1 - PRE: 3.53
MGC ASCENT PFT - FEV1 - PREDICTED: 3.13
MGC ASCENT PFT - FVC - PRE: 4.59
MGC ASCENT PFT - FVC - PREDICTED: 4.17

## 2025-03-04 PROCEDURE — 82947 ASSAY GLUCOSE BLOOD QUANT: CPT

## 2025-03-04 PROCEDURE — 78815 PET IMAGE W/CT SKULL-THIGH: CPT | Mod: PI

## 2025-03-04 PROCEDURE — 3430000001 HC RX 343 DIAGNOSTIC RADIOPHARMACEUTICALS: Performed by: THORACIC SURGERY (CARDIOTHORACIC VASCULAR SURGERY)

## 2025-03-04 PROCEDURE — A9552 F18 FDG: HCPCS | Performed by: THORACIC SURGERY (CARDIOTHORACIC VASCULAR SURGERY)

## 2025-03-04 RX ORDER — FLUDEOXYGLUCOSE F 18 200 MCI/ML
14.15 INJECTION, SOLUTION INTRAVENOUS
Status: COMPLETED | OUTPATIENT
Start: 2025-03-04 | End: 2025-03-04

## 2025-03-04 RX ADMIN — FLUDEOXYGLUCOSE F 18 14.15 MILLICURIE: 200 INJECTION, SOLUTION INTRAVENOUS at 12:22

## 2025-03-05 ENCOUNTER — TELEPHONE (OUTPATIENT)
Dept: CARDIOTHORACIC SURGERY | Facility: HOSPITAL | Age: 70
End: 2025-03-05
Payer: MEDICARE

## 2025-03-05 DIAGNOSIS — R91.1 LUNG NODULE: Primary | ICD-10-CM

## 2025-03-05 NOTE — TELEPHONE ENCOUNTER
Result Communication    Resulted Orders   NM PET CT lung CA initial diagnosis    Narrative    Interpreted By:  Efe Tilley and Ritchie Brandon   STUDY:  NM PET CT LUNG CA  INITIAL DIAGNOSIS;  3/4/2025 1:46 pm      INDICATION:  Signs/Symptoms:DIAGNOSTIC. Right middle lobe sub-solid lesion with  enlarging solid component on CT lung cancer screening 02/20/2025.      ,R91.1 Solitary pulmonary nodule,C34.2 Malignant neoplasm of middle  lobe, bronchus or lung      COMPARISON:  CT lung cancer screening 02/20/2025      ACCESSION NUMBER(S):  [id number]      ORDERING CLINICIAN:  AAKASH QUIJANO      TECHNIQUE:  DIVISION OF NUCLEAR MEDICINE  POSITRON EMISSION TOMOGRAPHY (PET-CT)      The patient received an intravenous dose of 14.2 mCi of Fluorine-18  fluorodeoxyglucose (FDG). Positron emission tomographic (PET) images  from mid-thigh to skull base were then acquired after a one hour  delay. Also acquired was a contemporaneous low dose non-contrast CT  scan performed for attenuation correction of PET images and anatomic  localization.  The PET and CT images were digitally fused for  display.  All images were acquired on a combined PET-CT scanner unit.  Some areas of FDG accumulation may be described in standardized  uptake value (SUV) units.      CODING:  Initial Treatment Strategy (PI)          CALIBRATION:  Dose Injection-to-Scan Interval (mins): 58 min  Mediastinal bloodpool SUV (normal 1.5-2.5): 1.9  Blood glucose: 102 mg/dL      FINDINGS:  NECK:  No focal hypermetabolic soft tissue lesion is seen in the neck.  No hypermetabolic cervical lymphadenopathy is present.          CHEST:  Redemonstration of predominantly subcentimeter ground-glass pulmonary  nodule within the right middle lobe with mild FDG uptake along the  central/medial aspect of the previously described areas of partially  solid components (max SUV 2.5). No evidence of hypermetabolic  mediastinal, hilar or axillary lymphadenopathy.          ABDOMEN  AND PELVIS:  No hypermetabolic soft tissue lesion is present in the abdomen and  pelvis. No evidence of hypermetabolic lymphadenopathy.  Physiologic radiotracer uptake is present in the liver and spleen  with excretion into the bowel loops and the genitourinary tract.          MUSCULOSKELETAL:  There is no focal hypermetabolic lesion to suggest osseous metastasis.        Impression    1. Subcentimeter ground-glass pulmonary nodule in the right middle  lobe with partially solid component that demonstrates hypermetabolic  activity. Findings are suggestive of early primary lung neoplasm  however additional differential considerations include  infectious/inflammatory process.  2. No evidence of hypermetabolic regional or distant metastatic  disease.      I personally reviewed the image(s) / study and agree with the  findings and interpretation as stated. This study was interpreted at  [facility].      MACRO:  None          Signed by: Efe Tilley 3/4/2025 2:59 PM  Dictation workstation:   [id number]       11:02 AM      Results were successfully communicated with the patient and they acknowledged their understanding. We discussed surgery vs biopsy. He preferred the concept of biopsy. My office will order IR biopsy.

## 2025-03-22 NOTE — PROGRESS NOTES
"Subjective   Patient ID: Chandana Casillas \"Mitesh" is a 69 y.o. male who presents for EVALUATION OF AN ELEVATED PSA. NO H/O FAMILY H/O PROSTATE CANCER. PT WAS NOT IN [facility]  HPI:  Are you experiencing:  Burning on urination -- NO  Pain on urination  -- NO  Urinary frequency -- NO  Urinary urgency --OCC  Urge incontinence --NO  Urinary stress incontinence  -- NO  Number of pads used per day --NO  Enuresis -- NO  Nocturia-- 1 X ON AVG  Hematuria -- NO  Hesitancy -- USUALLY AT NIGHT   Post void fullness -- NO  Strength of your stream-- GOOD    ROS:  General-- No C/O fever or chills  Head-- No C/O Dizziness  Eyes-- NO  C/O blurry or double vision  Ears-- No C/O hearing loss  Neck-- Supple  Chest-- No C/O pain or discomfort  Lungs-- No C/O shortness of breath  Abdomen-- No C/O  pain or discomfort, No nausea or vomiting  Back-- No C/O back pain or discomfort  Extremities-- No C/O swelling or pain    OBJECTIVE  General-- well-developed, well-nourished in NAD  Head-- normal cephalic, atraumatic  Eyes-- PERRL, EOM'S FROM, no jaundice  Neck-- Supple, without masses  Chest-- Normal bony structure  Abdomen-- soft, non tender, liver spleen not palpable. No suprapubic masses.  Back-- no flank masses palpable, no CVA tenderness on palpation or percussion  Lymph nodes-- No inguinal lymphadenopathy noted  Prostate-- 3+, firm, smooth, non-tender, without nodules  Testis-- both down, non-tender, without masses  Epididymis-- no masses palpable  Scrotum -- no hydrocele noted  Extremities -- Normal muscle mass and tone for the patients age  Neurological-- oriented times three    PSA:  3/26/2025--4.69  2/4/2025--7.1--Free PSA 23%  7/24/2024--4.78  1/11/2024--4.38  1/10/2023--4.66  1/6/2022--3.4  1/14/2021--4.58  1/28/2020--4.95  1/28/2019--3.66  1/17/2018--3.61    URINALYSIS DIPSTICK-- WNL    ASSESSMENT / PLAN  A:  ELEVATED PSA - NORMAL FEELING BUT ENLARGED PROSTATE ( ELEVATED PSA COULD BE SECONDARY TO THE LARGE SIZE OF HIS PROSTATE AND NOT " A MALIGNANCY )  NO FAMILY H/O PROSTATE CANCER  PT WAS NOT IN [place]  PATHOPHYSIOLOGY OF  THE ABOVE AND OPTIONS OF FURTHER EVALUATION DISCUSSED IN DETAIL  AL QUESTIONS ANSWERED     H/O A SOLID LUNG NODULE - PT TO HAVE A LUNG BX NEXT WEEK  P:  REPEAT THE PSA-- IF STILL HIGH WILL SCHEDULE A TRUSP BX  AFTER THE LUNG BX IS DONE   Raymond Hernandez MD 03/22/25 3:13 PM   ADDENDUM:  REPEAT PSA IS 4.69   P:  WILL TREAT THE PSA CONSERVATIVELY FOR NOW GIVEN THE UP COMING LUNG BX  F/U IN 6 MONTHS FOR A MARYJANE AND PSA RE CK -- I LEFT A MSG ON THE PT'S  ANS MACHINE

## 2025-03-26 ENCOUNTER — OFFICE VISIT (OUTPATIENT)
Dept: UROLOGY | Facility: CLINIC | Age: 70
End: 2025-03-26
Payer: MEDICARE

## 2025-03-26 VITALS
DIASTOLIC BLOOD PRESSURE: 70 MMHG | RESPIRATION RATE: 16 BRPM | SYSTOLIC BLOOD PRESSURE: 140 MMHG | WEIGHT: 191 LBS | TEMPERATURE: 97.9 F | HEIGHT: 71 IN | HEART RATE: 74 BPM | BODY MASS INDEX: 26.74 KG/M2

## 2025-03-26 DIAGNOSIS — R35.1 NOCTURIA: ICD-10-CM

## 2025-03-26 DIAGNOSIS — R97.20 ELEVATED PSA: ICD-10-CM

## 2025-03-26 DIAGNOSIS — R39.11 HESITANCY OF MICTURITION: ICD-10-CM

## 2025-03-26 DIAGNOSIS — R39.15 URGENCY OF MICTURITION: Primary | ICD-10-CM

## 2025-03-26 LAB
POC APPEARANCE, URINE: CLEAR
POC BILIRUBIN, URINE: NEGATIVE
POC BLOOD, URINE: NEGATIVE
POC COLOR, URINE: YELLOW
POC GLUCOSE, URINE: NEGATIVE MG/DL
POC KETONES, URINE: NEGATIVE MG/DL
POC LEUKOCYTES, URINE: NEGATIVE
POC NITRITE,URINE: NEGATIVE
POC PH, URINE: 5.5 PH
POC PROTEIN, URINE: NEGATIVE MG/DL
POC SPECIFIC GRAVITY, URINE: 1.02
POC UROBILINOGEN, URINE: 0.2 EU/DL

## 2025-03-26 PROCEDURE — 1123F ACP DISCUSS/DSCN MKR DOCD: CPT | Performed by: UROLOGY

## 2025-03-26 PROCEDURE — 99204 OFFICE O/P NEW MOD 45 MIN: CPT | Performed by: UROLOGY

## 2025-03-26 PROCEDURE — 1126F AMNT PAIN NOTED NONE PRSNT: CPT | Performed by: UROLOGY

## 2025-03-26 PROCEDURE — 81003 URINALYSIS AUTO W/O SCOPE: CPT | Mod: QW | Performed by: UROLOGY

## 2025-03-26 PROCEDURE — 1159F MED LIST DOCD IN RCRD: CPT | Performed by: UROLOGY

## 2025-03-26 PROCEDURE — 1160F RVW MEDS BY RX/DR IN RCRD: CPT | Performed by: UROLOGY

## 2025-03-26 PROCEDURE — 3008F BODY MASS INDEX DOCD: CPT | Performed by: UROLOGY

## 2025-03-26 PROCEDURE — 99214 OFFICE O/P EST MOD 30 MIN: CPT | Performed by: UROLOGY

## 2025-03-26 ASSESSMENT — PAIN SCALES - GENERAL: PAINLEVEL_OUTOF10: 0-NO PAIN

## 2025-03-26 ASSESSMENT — ENCOUNTER SYMPTOMS
LOSS OF SENSATION IN FEET: 0
DEPRESSION: 0
OCCASIONAL FEELINGS OF UNSTEADINESS: 0

## 2025-03-26 NOTE — LETTER
"March 28, 2025     Cali Menendez MD  [address]  [address]  [place] [address]    Patient: Bebo Casillas   YOB: 1955   Date of Visit: 3/26/2025       Dear Dr. Cali Menendez MD:    Thank you for referring Bebo Casillas to me for evaluation. Below are my notes for this consultation.  If you have questions, please do not hesitate to call me. I look forward to following your patient along with you.       Sincerely,     Ryamond Hernandez MD      CC: No Recipients  ______________________________________________________________________________________    Subjective  Patient ID: Chandana Casillas \"Mitesh" is a 69 y.o. male who presents for EVALUATION OF AN ELEVATED PSA. NO H/O FAMILY H/O PROSTATE CANCER. PT WAS NOT IN [facility]  HPI:  Are you experiencing:  Burning on urination -- NO  Pain on urination  -- NO  Urinary frequency -- NO  Urinary urgency --OCC  Urge incontinence --NO  Urinary stress incontinence  -- NO  Number of pads used per day --NO  Enuresis -- NO  Nocturia-- 1 X ON AVG  Hematuria -- NO  Hesitancy -- USUALLY AT NIGHT   Post void fullness -- NO  Strength of your stream-- GOOD    ROS:  General-- No C/O fever or chills  Head-- No C/O Dizziness  Eyes-- NO  C/O blurry or double vision  Ears-- No C/O hearing loss  Neck-- Supple  Chest-- No C/O pain or discomfort  Lungs-- No C/O shortness of breath  Abdomen-- No C/O  pain or discomfort, No nausea or vomiting  Back-- No C/O back pain or discomfort  Extremities-- No C/O swelling or pain    OBJECTIVE  General-- well-developed, well-nourished in NAD  Head-- normal cephalic, atraumatic  Eyes-- PERRL, EOM'S FROM, no jaundice  Neck-- Supple, without masses  Chest-- Normal bony structure  Abdomen-- soft, non tender, liver spleen not palpable. No suprapubic masses.  Back-- no flank masses palpable, no CVA tenderness on palpation or percussion  Lymph nodes-- No inguinal lymphadenopathy noted  Prostate-- 3+, firm, smooth, non-tender, without nodules  Testis-- both " down, non-tender, without masses  Epididymis-- no masses palpable  Scrotum -- no hydrocele noted  Extremities -- Normal muscle mass and tone for the patients age  Neurological-- oriented times three    PSA:  3/26/2025--4.69  2/4/2025--7.1--Free PSA 23%  7/24/2024--4.78  1/11/2024--4.38  1/10/2023--4.66  1/6/2022--3.4  1/14/2021--4.58  1/28/2020--4.95  1/28/2019--3.66  1/17/2018--3.61    URINALYSIS DIPSTICK-- WNL    ASSESSMENT / PLAN  A:  ELEVATED PSA - NORMAL FEELING BUT ENLARGED PROSTATE ( ELEVATED PSA COULD BE SECONDARY TO THE LARGE SIZE OF HIS PROSTATE AND NOT A MALIGNANCY )  NO FAMILY H/O PROSTATE CANCER  PT WAS NOT IN [facility]  PATHOPHYSIOLOGY OF  THE ABOVE AND OPTIONS OF FURTHER EVALUATION DISCUSSED IN DETAIL  AL QUESTIONS ANSWERED     H/O A SOLID LUNG NODULE - PT TO HAVE A LUNG BX NEXT WEEK  P:  REPEAT THE PSA-- IF STILL HIGH WILL SCHEDULE A TRUSP BX  AFTER THE LUNG BX IS DONE   Raymond Hernandez MD 03/22/25 3:13 PM   ADDENDUM:  REPEAT PSA IS 4.69   P:  WILL TREAT THE PSA CONSERVATIVELY FOR NOW GIVEN THE UP COMING LUNG BX  F/U IN 6 MONTHS FOR A MARYJANE AND PSA RE CK -- I LEFT A MSG ON THE PT'S  ANS MACHINE

## 2025-03-27 LAB — PSA SERPL-MCNC: 4.69 NG/ML

## 2025-04-01 ENCOUNTER — HOSPITAL ENCOUNTER (OUTPATIENT)
Dept: RADIOLOGY | Facility: HOSPITAL | Age: 70
Discharge: HOME | End: 2025-04-01
Payer: MEDICARE

## 2025-04-01 VITALS
OXYGEN SATURATION: 99 % | SYSTOLIC BLOOD PRESSURE: 152 MMHG | RESPIRATION RATE: 15 BRPM | TEMPERATURE: 97.5 F | HEART RATE: 72 BPM | DIASTOLIC BLOOD PRESSURE: 74 MMHG

## 2025-04-01 DIAGNOSIS — R91.1 LUNG NODULE: ICD-10-CM

## 2025-04-01 LAB
INR PPP: 1 (ref 0.9–1.1)
PLATELET # BLD AUTO: 180 X10*3/UL (ref 150–450)
PROTHROMBIN TIME: 10.6 SECONDS (ref 9.8–12.4)

## 2025-04-01 PROCEDURE — 32408 CORE NDL BX LNG/MED PERQ: CPT

## 2025-04-01 PROCEDURE — 99152 MOD SED SAME PHYS/QHP 5/>YRS: CPT | Performed by: RADIOLOGY

## 2025-04-01 PROCEDURE — 99152 MOD SED SAME PHYS/QHP 5/>YRS: CPT

## 2025-04-01 PROCEDURE — 7100000009 HC PHASE TWO TIME - INITIAL BASE CHARGE

## 2025-04-01 PROCEDURE — 85610 PROTHROMBIN TIME: CPT | Performed by: RADIOLOGY

## 2025-04-01 PROCEDURE — 85049 AUTOMATED PLATELET COUNT: CPT | Performed by: RADIOLOGY

## 2025-04-01 PROCEDURE — 99153 MOD SED SAME PHYS/QHP EA: CPT

## 2025-04-01 PROCEDURE — 99153 MOD SED SAME PHYS/QHP EA: CPT | Performed by: RADIOLOGY

## 2025-04-01 PROCEDURE — 2500000005 HC RX 250 GENERAL PHARMACY W/O HCPCS: Performed by: RADIOLOGY

## 2025-04-01 PROCEDURE — 7100000010 HC PHASE TWO TIME - EACH INCREMENTAL 1 MINUTE

## 2025-04-01 PROCEDURE — 2720000007 HC OR 272 NO HCPCS

## 2025-04-01 PROCEDURE — 2500000004 HC RX 250 GENERAL PHARMACY W/ HCPCS (ALT 636 FOR OP/ED): Performed by: RADIOLOGY

## 2025-04-01 PROCEDURE — 36415 COLL VENOUS BLD VENIPUNCTURE: CPT | Performed by: RADIOLOGY

## 2025-04-01 PROCEDURE — 71045 X-RAY EXAM CHEST 1 VIEW: CPT

## 2025-04-01 RX ORDER — FENTANYL CITRATE 50 UG/ML
INJECTION, SOLUTION INTRAMUSCULAR; INTRAVENOUS
Status: COMPLETED | OUTPATIENT
Start: 2025-04-01 | End: 2025-04-01

## 2025-04-01 RX ORDER — MIDAZOLAM HYDROCHLORIDE 1 MG/ML
INJECTION, SOLUTION INTRAMUSCULAR; INTRAVENOUS
Status: COMPLETED | OUTPATIENT
Start: 2025-04-01 | End: 2025-04-01

## 2025-04-01 RX ORDER — LIDOCAINE HYDROCHLORIDE 10 MG/ML
INJECTION, SOLUTION EPIDURAL; INFILTRATION; INTRACAUDAL; PERINEURAL
Status: COMPLETED | OUTPATIENT
Start: 2025-04-01 | End: 2025-04-01

## 2025-04-01 RX ADMIN — Medication 3 L/MIN: at 10:00

## 2025-04-01 RX ADMIN — MIDAZOLAM 1 MG: 1 INJECTION INTRAMUSCULAR; INTRAVENOUS at 10:31

## 2025-04-01 RX ADMIN — FENTANYL CITRATE 50 MCG: 50 INJECTION, SOLUTION INTRAMUSCULAR; INTRAVENOUS at 10:31

## 2025-04-01 RX ADMIN — LIDOCAINE HYDROCHLORIDE 1 ML: 10 INJECTION, SOLUTION EPIDURAL; INFILTRATION; INTRACAUDAL; PERINEURAL at 10:47

## 2025-04-01 ASSESSMENT — PAIN SCALES - GENERAL

## 2025-04-01 NOTE — NURSING NOTE
Patient post lung biopsy; doing well. Dressing has been dry and intact. In recovery, no complaints of pain or trouble breathing. Ok for discharge per dr. Moss after x-ray completed. Iv removed. Discharge instructions given to patient and patient verbalized understanding. Vital signs have been stable. Patient wheeled down to transport via wheelchair with rn.

## 2025-04-04 LAB
LAB AP ASR DISCLAIMER: NORMAL
LABORATORY COMMENT REPORT: NORMAL
PATH REPORT.COMMENTS IMP SPEC: NORMAL
PATH REPORT.FINAL DX SPEC: NORMAL
PATH REPORT.GROSS SPEC: NORMAL
PATH REPORT.RELEVANT HX SPEC: NORMAL
PATH REPORT.TOTAL CANCER: NORMAL

## 2025-04-09 LAB
ELECTRONICALLY SIGNED BY: NORMAL
FOCUSED SOLID TUMOR DNA/RNA RESULTS: NORMAL

## 2025-04-09 PROCEDURE — G0452 MOLECULAR PATHOLOGY INTERPR: HCPCS | Performed by: THORACIC SURGERY (CARDIOTHORACIC VASCULAR SURGERY)

## 2025-04-09 PROCEDURE — 81458 SO GSAP DNA CPY NMBR&MCRSTL: CPT | Performed by: THORACIC SURGERY (CARDIOTHORACIC VASCULAR SURGERY)

## 2025-04-09 NOTE — H&P (VIEW-ONLY)
Chad Casillas  is a 69 y.o. male who presents for evaluation of right middle lobe lung nodule status post CT biopsy on 4/1/25.  Pathology confirmed adenocarcinoma, lepidic and papillary patterns.       This patient has been receiving CT screening for lung cancer. A CT scan performed on 1/17/2024 showed a groundglass opacity in the right middle lobe measuring 14 mm in size.  He received a follow-up scan on 2/20/2025 which showed a solid component measuring 7 mm with in this groundglass lesion.  I recommended a PET/CT which was concerning for early primary lung cancer.  I recommended a CT-guided biopsy.  This was performed on 4/1/2025 and consistent with adenocarcinoma which was TTF-1 positive.    He  has a past medical history of Benign prostatic hyperplasia (02/21/2025), Elevated low density lipoprotein (LDL) cholesterol level (02/21/2025), Herpes zoster (02/21/2025), Occlusion of carotid artery (02/21/2025), Overweight with body mass index (BMI) 25.0-29.9 (02/21/2025), Personal history of colonic polyps (01/05/2017), Personal history of other diseases of the circulatory system, and Personal history of other endocrine, nutritional and metabolic disease.  He  has a past surgical history that includes Cardiac catheterization.  He   Family History   Problem Relation Name Age of Onset    Colon cancer Mother          Diagnosed in her 80s.    Brainstem hemorrhage (Multi) Father      Hypertension Father         He  reports that he has quit smoking. His smoking use included cigars. He has never used smokeless tobacco. He reports current alcohol use of about 10.0 standard drinks of alcohol per week. He reports that he does not use drugs.    Objective   Physical Exam  Phone visit (audio only evaluation)   Diagnostic Studies  I reviewed the test results described in the HPI    PFTs: 112/92    Assessment/Plan   I believe that the patient has a new diagnosis of cancer.     This patient has a new diagnosis of right  middle lobe lung cancer.  His PET/CT suggest that this is localized/early stage cancer, and therefore I believe surgical resection is appropriate.  Given the somewhat central nature of the cancer, I believe a right middle lobe lobectomy is ideal procedures take care of this.  Based on his medical history and pulmonary function test, I believe he can tolerate this procedure appropriately with typical risk.  We discussed the surgery in detail. I carefully described the risks, benefits, and alternatives to surgical intervention. We discussed the possibility of serious complications including death. I answered any questions they expressed. After this, the patient agreed to proceed with surgery    I recommend surgery.  Robotic right middle lobe lobectomy    I discussed this in detail with the patient, including a discussion of alternatives. They were comfortable with this approach.     Junior Beckwith MD  [phone]  Time 24 min

## 2025-04-09 NOTE — PROGRESS NOTES
Chad Casillas  is a 69 y.o. male who presents for evaluation of right middle lobe lung nodule status post CT biopsy on 4/1/25.  Pathology confirmed adenocarcinoma, lepidic and papillary patterns.       This patient has been receiving CT screening for lung cancer. A CT scan performed on 1/17/2024 showed a groundglass opacity in the right middle lobe measuring 14 mm in size.  He received a follow-up scan on 2/20/2025 which showed a solid component measuring 7 mm with in this groundglass lesion.  I recommended a PET/CT which was concerning for early primary lung cancer.  I recommended a CT-guided biopsy.  This was performed on 4/1/2025 and consistent with adenocarcinoma which was TTF-1 positive.    He  has a past medical history of Benign prostatic hyperplasia (02/21/2025), Elevated low density lipoprotein (LDL) cholesterol level (02/21/2025), Herpes zoster (02/21/2025), Occlusion of carotid artery (02/21/2025), Overweight with body mass index (BMI) 25.0-29.9 (02/21/2025), Personal history of colonic polyps (01/05/2017), Personal history of other diseases of the circulatory system, and Personal history of other endocrine, nutritional and metabolic disease.  He  has a past surgical history that includes Cardiac catheterization.  He   Family History   Problem Relation Name Age of Onset    Colon cancer Mother          Diagnosed in her 80s.    Brainstem hemorrhage (Multi) Father      Hypertension Father         He  reports that he has quit smoking. His smoking use included cigars. He has never used smokeless tobacco. He reports current alcohol use of about 10.0 standard drinks of alcohol per week. He reports that he does not use drugs.    Objective   Physical Exam  Phone visit (audio only evaluation)   Diagnostic Studies  I reviewed the test results described in the HPI    PFTs: 112/92    Assessment/Plan   I believe that the patient has a new diagnosis of cancer.     This patient has a new diagnosis of right  middle lobe lung cancer.  His PET/CT suggest that this is localized/early stage cancer, and therefore I believe surgical resection is appropriate.  Given the somewhat central nature of the cancer, I believe a right middle lobe lobectomy is ideal procedures take care of this.  Based on his medical history and pulmonary function test, I believe he can tolerate this procedure appropriately with typical risk.  We discussed the surgery in detail. I carefully described the risks, benefits, and alternatives to surgical intervention. We discussed the possibility of serious complications including death. I answered any questions they expressed. After this, the patient agreed to proceed with surgery    I recommend surgery.  Robotic right middle lobe lobectomy    I discussed this in detail with the patient, including a discussion of alternatives. They were comfortable with this approach.     Junior Beckwith MD  [phone]  Time 24 min

## 2025-04-10 ENCOUNTER — TELEMEDICINE (OUTPATIENT)
Dept: SURGERY | Facility: HOSPITAL | Age: 70
End: 2025-04-10
Payer: MEDICARE

## 2025-04-10 DIAGNOSIS — C34.2 CANCER OF MIDDLE LOBE OF LUNG (MULTI): Primary | ICD-10-CM

## 2025-04-10 DIAGNOSIS — C34.2 PRIMARY CANCER OF RIGHT MIDDLE LOBE OF LUNG (MULTI): ICD-10-CM

## 2025-04-10 RX ORDER — HEPARIN SODIUM 5000 [USP'U]/ML
5000 INJECTION, SOLUTION INTRAVENOUS; SUBCUTANEOUS ONCE
OUTPATIENT
Start: 2025-04-10 | End: 2025-04-10

## 2025-04-10 RX ORDER — ACETAMINOPHEN 325 MG/1
650 TABLET ORAL ONCE
OUTPATIENT
Start: 2025-04-10 | End: 2025-04-10

## 2025-04-10 RX ORDER — GABAPENTIN 300 MG/1
300 CAPSULE ORAL ONCE
OUTPATIENT
Start: 2025-04-10 | End: 2025-04-10

## 2025-04-16 ENCOUNTER — PRE-ADMISSION TESTING (OUTPATIENT)
Dept: PREADMISSION TESTING | Facility: HOSPITAL | Age: 70
End: 2025-04-16
Payer: MEDICARE

## 2025-04-16 VITALS
RESPIRATION RATE: 16 BRPM | TEMPERATURE: 97.3 F | WEIGHT: 187.39 LBS | DIASTOLIC BLOOD PRESSURE: 65 MMHG | HEIGHT: 71 IN | OXYGEN SATURATION: 95 % | BODY MASS INDEX: 26.23 KG/M2 | SYSTOLIC BLOOD PRESSURE: 138 MMHG | HEART RATE: 70 BPM

## 2025-04-16 DIAGNOSIS — C34.2 PRIMARY CANCER OF RIGHT MIDDLE LOBE OF LUNG (MULTI): ICD-10-CM

## 2025-04-16 DIAGNOSIS — E78.5 HYPERLIPIDEMIA, UNSPECIFIED HYPERLIPIDEMIA TYPE: ICD-10-CM

## 2025-04-16 DIAGNOSIS — Z01.818 PREOP TESTING: Primary | ICD-10-CM

## 2025-04-16 LAB
ABO GROUP (TYPE) IN BLOOD: NORMAL
ABO GROUP (TYPE) IN BLOOD: NORMAL
ANTIBODY SCREEN: NORMAL
RH FACTOR (ANTIGEN D): NORMAL
RH FACTOR (ANTIGEN D): NORMAL

## 2025-04-16 PROCEDURE — 87081 CULTURE SCREEN ONLY: CPT | Mod: PARLAB

## 2025-04-16 PROCEDURE — 36415 COLL VENOUS BLD VENIPUNCTURE: CPT | Performed by: THORACIC SURGERY (CARDIOTHORACIC VASCULAR SURGERY)

## 2025-04-16 PROCEDURE — 93005 ELECTROCARDIOGRAM TRACING: CPT

## 2025-04-16 PROCEDURE — 99204 OFFICE O/P NEW MOD 45 MIN: CPT | Performed by: NURSE PRACTITIONER

## 2025-04-16 PROCEDURE — 93010 ELECTROCARDIOGRAM REPORT: CPT | Performed by: STUDENT IN AN ORGANIZED HEALTH CARE EDUCATION/TRAINING PROGRAM

## 2025-04-16 PROCEDURE — 36415 COLL VENOUS BLD VENIPUNCTURE: CPT

## 2025-04-16 PROCEDURE — 86901 BLOOD TYPING SEROLOGIC RH(D): CPT | Performed by: THORACIC SURGERY (CARDIOTHORACIC VASCULAR SURGERY)

## 2025-04-16 RX ORDER — CHLORHEXIDINE GLUCONATE ORAL RINSE 1.2 MG/ML
15 SOLUTION DENTAL DAILY
Qty: 30 ML | Refills: 0 | Status: SHIPPED | OUTPATIENT
Start: 2025-04-16 | End: 2025-04-18

## 2025-04-16 RX ORDER — CHLORHEXIDINE GLUCONATE 40 MG/ML
SOLUTION TOPICAL DAILY
Qty: 118 ML | Refills: 0 | Status: SHIPPED | OUTPATIENT
Start: 2025-04-16 | End: 2025-04-21

## 2025-04-16 ASSESSMENT — DUKE ACTIVITY SCORE INDEX (DASI)
TOTAL_SCORE: 50.7
CAN YOU CLIMB A FLIGHT OF STAIRS OR WALK UP A HILL: YES
CAN YOU PARTICIPATE IN MODERATE RECREATIONAL ACTIVITIES LIKE GOLF, BOWLING, DANCING, DOUBLES TENNIS OR THROWING A BASEBALL OR FOOTBALL: YES
CAN YOU RUN A SHORT DISTANCE: YES
CAN YOU WALK A BLOCK OR TWO ON LEVEL GROUND: YES
CAN YOU TAKE CARE OF YOURSELF (EAT, DRESS, BATHE, OR USE TOILET): YES
CAN YOU DO LIGHT WORK AROUND THE HOUSE LIKE DUSTING OR WASHING DISHES: YES
DASI METS SCORE: 9
CAN YOU DO YARD WORK LIKE RAKING LEAVES, WEEDING OR PUSHING A MOWER: YES
CAN YOU DO HEAVY WORK AROUND THE HOUSE LIKE SCRUBBING FLOORS OR LIFTING AND MOVING HEAVY FURNITURE: YES
CAN YOU PARTICIPATE IN STRENOUS SPORTS LIKE SWIMMING, SINGLES TENNIS, FOOTBALL, BASKETBALL, OR SKIING: NO
CAN YOU DO MODERATE WORK AROUND THE HOUSE LIKE VACUUMING, SWEEPING FLOORS OR CARRYING GROCERIES: YES
CAN YOU WALK INDOORS, SUCH AS AROUND YOUR HOUSE: YES
CAN YOU HAVE SEXUAL RELATIONS: YES

## 2025-04-16 NOTE — CPM/PAT H&P
"CPM/PAT Evaluation       Name: Chandana Casillas (Chandana Casillas \"Bebo\")  /Age: 1955/69 y.o.     In-Person       Chief Complaint: PAT for planned Right lung surgery    69 yr old male w/PHx of HTN, HLD, BPH and Right middle lobe ca referred to PAT for planned Robotic assisted Right lung lobe resection & lymph node dissection/bronchoscopy/possible thoracotomy w/Dr Beckwith on 2025    Patient reports feeling overall well, denies fever, cough or recent infection. Denies hx of stroke, seizure or blood clot.  Reports remaining otherwise physically active, routinely working out 5-6x weekly and walking 10,000 steps daily; denies cardiac or respiratory symptoms. Past surgical hx includes cardiac cath (); denies past issues with anesthesia.      Followed by PCP (Cali Menendez MD) 2025  Followed by urology (Raymond Hernandez MD) 3/26/2025        Medical History[1]    Surgical History[2]    Patient  has no history on file for sexual activity.    Family History[3]    Allergies[4]    Prior to Admission medications    Medication Sig Start Date End Date Taking? Authorizing Provider   ascorbic acid (Vitamin C) 500 mg tablet Take 1 tablet (500 mg) by mouth once daily.    Historical Provider, MD   aspirin 81 mg EC tablet     Historical Provider, MD   cyanocobalamin (Vitamin B-12) 1,000 mcg tablet Take 1 tablet (1,000 mcg) by mouth once daily.    Historical Provider, MD   diphenhydrAMINE (Sominex) 25 mg tablet Take 1 tablet (25 mg) by mouth once daily at bedtime.    Historical Provider, MD   docosahexaenoic acid/epa (FISH OIL ORAL) Take 2 tablets by mouth once daily.    Historical Provider, MD   doxazosin (Cardura) 4 mg tablet TAKE 1 TABLET BY MOUTH ONCE DAILY 24   Timo Martinez,    hydrocortisone 2.5 % cream Apply topically 2 times a day. 24   Timo Martinez DO   melatonin 5 mg capsule Take 1 capsule (5 mg) by mouth once daily at bedtime.    Historical Provider, MD   multivitamin with minerals iron-free " "(Centrum Silver) Take 1 tablet by mouth once daily.    Historical Provider, MD   simvastatin (Zocor) 80 mg tablet TAKE 1 TABLET BY MOUTH ONCE DAILY 12/16/24   Timo Martinez,    vitamin D3-folic acid 250 mcg (10,000 unit)-1 mg tablet Take 1 tablet by mouth once daily.    Historical Provider, MD        A ten-point review of systems was completed and is otherwise negative except for what is mentioned in the HPI above.    Physical Exam  Vitals reviewed.   HENT:      Head: Normocephalic.   Eyes:      Pupils: Pupils are equal, round, and reactive to light.      Comments: Corrective lenses   Cardiovascular:      Rate and Rhythm: Normal rate and regular rhythm.      Pulses: Normal pulses.   Pulmonary:      Effort: Pulmonary effort is normal.      Breath sounds: Normal breath sounds.   Abdominal:      General: Bowel sounds are normal.   Musculoskeletal:         General: Normal range of motion.      Cervical back: Normal range of motion.   Skin:     General: Skin is warm and dry.   Neurological:      Mental Status: He is alert and oriented to person, place, and time.   Psychiatric:         Mood and Affect: Mood normal.          PAT AIRWAY:   Airway:     Mallampati::  II    TM distance::  >3 FB    Neck ROM::  Full   Permanent bridge connected to implants to upper front   implants      Testing/Diagnostic: CBC, BMP, coag, staph/MRSA    Patient Specialist/PCP: Cali Menendez MD (PCP) 2/4/2025; Raymond Hernandez MD (urology) 3/26/2025    Visit Vitals  /65   Pulse 70   Temp 36.3 °C (97.3 °F)   Resp 16   Ht 1.803 m (5' 11\")   Wt 85 kg (187 lb 6.3 oz)   SpO2 95%   BMI 26.14 kg/m²   Smoking Status Former   BSA 2.06 m²       DASI Risk Score      Flowsheet Row Pre-Admission Testing [facility]/16/[facility]25 in [facility]   Can you take care of yourself (eat, dress, bathe, or use toilet)?  2.75 filed at 04/16/2025 1035   Can you walk indoors, such as around your house? 1.75 filed at 04/16/2025 1035   Can you walk a block or two on " level ground?  2.75 filed at 04/16/2025 1035   Can you climb a flight of stairs or walk up a hill? 5.5 filed at 04/16/2025 1035   Can you run a short distance? 8 filed at 04/16/2025 1035   Can you do light work around the house like dusting or washing dishes? 2.7 filed at 04/16/2025 1035   Can you do moderate work around the house like vacuuming, sweeping floors or carrying groceries? 3.5 filed at 04/16/2025 1035   Can you do heavy work around the house like scrubbing floors or lifting and moving heavy furniture?  8 filed at 04/16/2025 1035   Can you do yard work like raking leaves, weeding or pushing a mower? 4.5 filed at 04/16/2025 1035   Can you have sexual relations? 5.25 filed at 04/16/2025 1035   Can you participate in moderate recreational activities like golf, bowling, dancing, doubles tennis or throwing a baseball or football? 6 filed at 04/16/2025 1035   Can you participate in strenous sports like swimming, singles tennis, football, basketball, or skiing? 0 filed at 04/16/2025 1035   DASI SCORE 50.7 filed at 04/16/2025 1035   METS Score (Will be calculated only when all the questions are answered) 9 filed at 04/16/2025 1035          Caprini DVT Assessment    No data to display       Modified Frailty Index    No data to display       [id number] Stroke Risk Points  Current as of just now        N/A 0 to 9 Points:      Last Change: N/A          The CZU7WE1-EHAg risk score (Lip GH, et al. 2009. © 2010 [employer]) quantifies the risk of stroke for a patient with atrial fibrillation. For patients without atrial fibrillation or under the age of 18 this score appears as N/A. Higher score values generally indicate higher risk of stroke.        This score is not applicable to this patient. Components are not calculated.          Revised Cardiac Risk Index    No data to display       Apfel Simplified Score    No data to display       Risk Analysis Index Results This Encounter    No data  found in the last 10 encounters.       Stop Bang Score      Flowsheet Row Pre-Admission Testing from 4/16/2025 in [facility]   Do you snore loudly? 1 filed at 04/16/2025 1035   Do you often feel tired or fatigued after your sleep? 0 filed at 04/16/2025 1035   Has anyone ever observed you stop breathing in your sleep? 0 filed at 04/16/2025 1035   Do you have or are you being treated for high blood pressure? 0 filed at 04/16/2025 1035   Recent BMI (Calculated) 26.2 filed at 04/16/2025 1035   Is BMI greater than 35 kg/m2? 0=No filed at 04/16/2025 1035   Age older than 50 years old? 1=Yes filed at 04/16/2025 1035   Is your neck circumference greater than 17 inches (Male) or 16 inches (Female)? 0 filed at 04/16/2025 1035   Gender - Male 1=Yes filed at 04/16/2025 1035   STOP-BANG Total Score 3 filed at 04/16/2025 1035          Prodigy: High Risk  Total Score: 20              Prodigy Age Score      Prodigy Gender Score          ARISCAT Score for Postoperative Pulmonary Complications    No data to display       Canelo Perioperative Risk for Myocardial Infarction or Cardiac Arrest (RAFAL)    No data to display         Assessment and Plan:     # HTN - continue doxazosin  # HLD - continue simvastatin  # BPH/elevated PSA - PSA 4.69 (3/26/2025); followed by urology  # Right middle lobe ca - Robotic assisted Right lung lobe resection & lymph node dissection/bronchoscopy/possible thoracotomy w/Dr Beckwith on 4/22/2025    Ecg complete 4/16/2025 - NSR, Septal infarct, age undetermined (75 bpm)  Medical hx, Allergies, VS and Labs reviewed  Medications addressed w/pre-op instructions provided             [1]   Past Medical History:  Diagnosis Date    Benign prostatic hyperplasia 02/21/2025    Elevated low density lipoprotein (LDL) cholesterol level 02/21/2025    Herpes zoster 02/21/2025    Lung cancer (Multi)     right middle    Occlusion of carotid artery 02/21/2025    Overweight with body mass index (BMI) 25.0-29.9 02/21/2025     Personal history of colonic polyps 01/05/2017    History of colonic polyps    Personal history of other diseases of the circulatory system     History of coronary atherosclerosis    Personal history of other endocrine, nutritional and metabolic disease     History of hypercholesterolemia    Vision loss    [2]   Past Surgical History:  Procedure Laterality Date    CARDIAC CATHETERIZATION      COLONOSCOPY     [3]   Family History  Problem Relation Name Age of Onset    Colon cancer Mother          Diagnosed in her 80s.    Brainstem hemorrhage (Multi) Father      Hypertension Father     [4] No Known Allergies

## 2025-04-16 NOTE — PREPROCEDURE INSTRUCTIONS
One of our staff members will call you one business day before your surgery between 12-4pm to let you know the time to arrive at the hospital. If you have not received a phone call by 2pm call [phone].     When you arrive at the hospital, go to Registration on the ground floor.   You will need a responsible adult to drive you home.     Prior to surgery date:  One (1) week prior to surgery STOP:  -Stop aspirin products. Do not take NSAIDS/ Ibuprofen, Aleve, Motrin. Okay to take Tylenol or Acetaminophen. Do not take vitamins, supplements, herbals, diet pills.   -Stop these medications:   -No alcohol for 24 hours prior to surgery.  -No smoking 24 hours prior. No Marijuana, CBD oil, or Vaping 48 hours prior to surgery.  -Enjoy a light supper the evening before surgery.  _Use protein drinks as directed.     Day of Surgery:  -Nothing to eat or drink after midnight. This includes food of any kind (including hard candy, cough drops, mints and gum, coffee).   -Have 13oz of Clear liquids 2hrs prior to Arrival time.   -No acrylic nails or nail polish on at least one fingernail; no polish on toes for foot surgery.   -No body piercing or jewelry.   -Shower as directed. No deodorant, lotion, power, oils, perfume, make-up.  -Mouthwash night before and morning of surgery.   -Wear loose comfortable clothing to accommodate your bandages.     -If  you have questions for PAT please call [phone].      Medication List            Accurate as of April 16, 2025 10:55 AM. Always use your most recent med list.                ascorbic acid 500 mg tablet  Commonly known as: Vitamin C  Additional Medication Adjustments for Surgery: Take last dose 7 days before surgery     aspirin 81 mg EC tablet  Additional Medication Adjustments for Surgery: Take last dose 7 days before surgery     * chlorhexidine 0.12 % solution  Commonly known as: Peridex  Use 15 mL in the mouth or throat once daily for 2 doses. Swish and Spit day before surgery and  again morning on day of surgery.  Medication Adjustments for Surgery: Take/Use as prescribed     * chlorhexidine 4 % external liquid  Commonly known as: Hibiclens  Apply topically once daily for 5 days. Wash daily for 5 days prior to surgery with day 5 being morning of surgery.  Medication Adjustments for Surgery: Take/Use as prescribed     cyanocobalamin 1,000 mcg tablet  Commonly known as: Vitamin B-12  Additional Medication Adjustments for Surgery: Take last dose 7 days before surgery     doxazosin 4 mg tablet  Commonly known as: Cardura  TAKE 1 TABLET BY MOUTH ONCE DAILY  Medication Adjustments for Surgery: Take/Use as prescribed     hydrocortisone 2.5 % cream  Apply topically 2 times a day.  Medication Adjustments for Surgery: Do Not take on the morning of surgery     melatonin 5 mg capsule  Medication Adjustments for Surgery: Do Not take on the morning of surgery     multivitamin with minerals iron-free  Commonly known as: Centrum Silver  Additional Medication Adjustments for Surgery: Take last dose 7 days before surgery     simvastatin 80 mg tablet  Commonly known as: Zocor  TAKE 1 TABLET BY MOUTH ONCE DAILY  Medication Adjustments for Surgery: Take/Use as prescribed     vitamin D3-folic acid 250 mcg (10,000 unit)-1 mg tablet  Additional Medication Adjustments for Surgery: Take last dose 7 days before surgery           * This list has 2 medication(s) that are the same as other medications prescribed for you. Read the directions carefully, and ask your doctor or other care provider to review them with you.

## 2025-04-18 LAB
ATRIAL RATE: 70 BPM
P AXIS: 31 DEGREES
P OFFSET: 168 MS
P ONSET: 110 MS
PR INTERVAL: 214 MS
Q ONSET: 217 MS
QRS COUNT: 11 BEATS
QRS DURATION: 120 MS
QT INTERVAL: 400 MS
QTC CALCULATION(BAZETT): 432 MS
QTC FREDERICIA: 421 MS
R AXIS: -9 DEGREES
STAPHYLOCOCCUS SPEC CULT: ABNORMAL
T AXIS: 17 DEGREES
T OFFSET: 417 MS
VENTRICULAR RATE: 70 BPM

## 2025-04-22 ENCOUNTER — ANESTHESIA (OUTPATIENT)
Dept: OPERATING ROOM | Facility: HOSPITAL | Age: 70
End: 2025-04-22
Payer: MEDICARE

## 2025-04-22 ENCOUNTER — HOSPITAL ENCOUNTER (INPATIENT)
Facility: HOSPITAL | Age: 70
LOS: 1 days | Discharge: HOME | End: 2025-04-23
Attending: THORACIC SURGERY (CARDIOTHORACIC VASCULAR SURGERY) | Admitting: THORACIC SURGERY (CARDIOTHORACIC VASCULAR SURGERY)
Payer: MEDICARE

## 2025-04-22 ENCOUNTER — APPOINTMENT (OUTPATIENT)
Dept: RADIOLOGY | Facility: HOSPITAL | Age: 70
End: 2025-04-22
Payer: MEDICARE

## 2025-04-22 ENCOUNTER — APPOINTMENT (OUTPATIENT)
Dept: CARDIOLOGY | Facility: HOSPITAL | Age: 70
End: 2025-04-22
Payer: MEDICARE

## 2025-04-22 ENCOUNTER — ANESTHESIA EVENT (OUTPATIENT)
Dept: OPERATING ROOM | Facility: HOSPITAL | Age: 70
End: 2025-04-22
Payer: MEDICARE

## 2025-04-22 DIAGNOSIS — Z90.2 S/P LOBECTOMY OF LUNG: ICD-10-CM

## 2025-04-22 DIAGNOSIS — C34.2 CANCER OF MIDDLE LOBE OF LUNG (MULTI): Primary | ICD-10-CM

## 2025-04-22 DIAGNOSIS — C34.2 PRIMARY CANCER OF RIGHT MIDDLE LOBE OF LUNG (MULTI): ICD-10-CM

## 2025-04-22 LAB
ANION GAP SERPL CALC-SCNC: 9 MMOL/L (ref 10–20)
BUN SERPL-MCNC: 14 MG/DL (ref 6–23)
CA-I BLD-SCNC: 1.2 MMOL/L (ref 1.1–1.33)
CALCIUM SERPL-MCNC: 9.2 MG/DL (ref 8.6–10.3)
CHLORIDE SERPL-SCNC: 102 MMOL/L (ref 98–107)
CO2 SERPL-SCNC: 30 MMOL/L (ref 21–32)
CREAT SERPL-MCNC: 0.87 MG/DL (ref 0.5–1.3)
EGFRCR SERPLBLD CKD-EPI 2021: >90 ML/MIN/1.73M*2
ERYTHROCYTE [DISTWIDTH] IN BLOOD BY AUTOMATED COUNT: 12.9 % (ref 11.5–14.5)
GLUCOSE SERPL-MCNC: 142 MG/DL (ref 74–99)
HCT VFR BLD AUTO: 44.5 % (ref 41–52)
HGB BLD-MCNC: 14 G/DL (ref 13.5–17.5)
MAGNESIUM SERPL-MCNC: 1.8 MG/DL (ref 1.6–2.4)
MCH RBC QN AUTO: 29.8 PG (ref 26–34)
MCHC RBC AUTO-ENTMCNC: 31.5 G/DL (ref 32–36)
MCV RBC AUTO: 95 FL (ref 80–100)
NRBC BLD-RTO: 0 /100 WBCS (ref 0–0)
PHOSPHATE SERPL-MCNC: 2.9 MG/DL (ref 2.5–4.9)
PLATELET # BLD AUTO: 191 X10*3/UL (ref 150–450)
POTASSIUM SERPL-SCNC: 3.9 MMOL/L (ref 3.5–5.3)
RBC # BLD AUTO: 4.7 X10*6/UL (ref 4.5–5.9)
SODIUM SERPL-SCNC: 137 MMOL/L (ref 136–145)
WBC # BLD AUTO: 10.3 X10*3/UL (ref 4.4–11.3)

## 2025-04-22 PROCEDURE — 2500000004 HC RX 250 GENERAL PHARMACY W/ HCPCS (ALT 636 FOR OP/ED): Mod: JZ | Performed by: ANESTHESIOLOGY

## 2025-04-22 PROCEDURE — 32674 THORACOSCOPY LYMPH NODE EXC: CPT | Performed by: PHYSICIAN ASSISTANT

## 2025-04-22 PROCEDURE — 2020000001 HC ICU ROOM DAILY

## 2025-04-22 PROCEDURE — 32674 THORACOSCOPY LYMPH NODE EXC: CPT | Performed by: THORACIC SURGERY (CARDIOTHORACIC VASCULAR SURGERY)

## 2025-04-22 PROCEDURE — 88305 TISSUE EXAM BY PATHOLOGIST: CPT | Mod: TC,PARLAB | Performed by: THORACIC SURGERY (CARDIOTHORACIC VASCULAR SURGERY)

## 2025-04-22 PROCEDURE — 2780000003 HC OR 278 NO HCPCS: Performed by: THORACIC SURGERY (CARDIOTHORACIC VASCULAR SURGERY)

## 2025-04-22 PROCEDURE — 88309 TISSUE EXAM BY PATHOLOGIST: CPT | Performed by: STUDENT IN AN ORGANIZED HEALTH CARE EDUCATION/TRAINING PROGRAM

## 2025-04-22 PROCEDURE — 71045 X-RAY EXAM CHEST 1 VIEW: CPT | Performed by: RADIOLOGY

## 2025-04-22 PROCEDURE — 2500000001 HC RX 250 WO HCPCS SELF ADMINISTERED DRUGS (ALT 637 FOR MEDICARE OP): Performed by: THORACIC SURGERY (CARDIOTHORACIC VASCULAR SURGERY)

## 2025-04-22 PROCEDURE — A32663 PR THORACOSCOPY SURG LOBECTOMY: Performed by: NURSE ANESTHETIST, CERTIFIED REGISTERED

## 2025-04-22 PROCEDURE — 3700000001 HC GENERAL ANESTHESIA TIME - INITIAL BASE CHARGE: Performed by: THORACIC SURGERY (CARDIOTHORACIC VASCULAR SURGERY)

## 2025-04-22 PROCEDURE — 99100 ANES PT EXTEME AGE<1 YR&>70: CPT | Performed by: ANESTHESIOLOGY

## 2025-04-22 PROCEDURE — 3600000018 HC OR TIME - INITIAL BASE CHARGE - PROCEDURE LEVEL SIX: Performed by: THORACIC SURGERY (CARDIOTHORACIC VASCULAR SURGERY)

## 2025-04-22 PROCEDURE — 2500000004 HC RX 250 GENERAL PHARMACY W/ HCPCS (ALT 636 FOR OP/ED): Mod: JZ

## 2025-04-22 PROCEDURE — 94640 AIRWAY INHALATION TREATMENT: CPT

## 2025-04-22 PROCEDURE — 32663 THORACOSCOPY W/LOBECTOMY: CPT | Performed by: THORACIC SURGERY (CARDIOTHORACIC VASCULAR SURGERY)

## 2025-04-22 PROCEDURE — 07T74ZZ RESECTION OF THORAX LYMPHATIC, PERCUTANEOUS ENDOSCOPIC APPROACH: ICD-10-PCS | Performed by: THORACIC SURGERY (CARDIOTHORACIC VASCULAR SURGERY)

## 2025-04-22 PROCEDURE — 3600000017 HC OR TIME - EACH INCREMENTAL 1 MINUTE - PROCEDURE LEVEL SIX: Performed by: THORACIC SURGERY (CARDIOTHORACIC VASCULAR SURGERY)

## 2025-04-22 PROCEDURE — 2500000004 HC RX 250 GENERAL PHARMACY W/ HCPCS (ALT 636 FOR OP/ED): Performed by: NURSE PRACTITIONER

## 2025-04-22 PROCEDURE — 2500000002 HC RX 250 W HCPCS SELF ADMINISTERED DRUGS (ALT 637 FOR MEDICARE OP, ALT 636 FOR OP/ED): Performed by: NURSE PRACTITIONER

## 2025-04-22 PROCEDURE — 7100000001 HC RECOVERY ROOM TIME - INITIAL BASE CHARGE: Performed by: THORACIC SURGERY (CARDIOTHORACIC VASCULAR SURGERY)

## 2025-04-22 PROCEDURE — 99223 1ST HOSP IP/OBS HIGH 75: CPT | Performed by: NURSE PRACTITIONER

## 2025-04-22 PROCEDURE — 2500000002 HC RX 250 W HCPCS SELF ADMINISTERED DRUGS (ALT 637 FOR MEDICARE OP, ALT 636 FOR OP/ED): Mod: JZ | Performed by: THORACIC SURGERY (CARDIOTHORACIC VASCULAR SURGERY)

## 2025-04-22 PROCEDURE — 99222 1ST HOSP IP/OBS MODERATE 55: CPT

## 2025-04-22 PROCEDURE — A32663 PR THORACOSCOPY SURG LOBECTOMY: Performed by: ANESTHESIOLOGY

## 2025-04-22 PROCEDURE — 2500000004 HC RX 250 GENERAL PHARMACY W/ HCPCS (ALT 636 FOR OP/ED): Performed by: THORACIC SURGERY (CARDIOTHORACIC VASCULAR SURGERY)

## 2025-04-22 PROCEDURE — 36620 INSERTION CATHETER ARTERY: CPT | Performed by: ANESTHESIOLOGY

## 2025-04-22 PROCEDURE — 2500000005 HC RX 250 GENERAL PHARMACY W/O HCPCS: Performed by: NURSE PRACTITIONER

## 2025-04-22 PROCEDURE — 0BBD4ZZ EXCISION OF RIGHT MIDDLE LUNG LOBE, PERCUTANEOUS ENDOSCOPIC APPROACH: ICD-10-PCS | Performed by: THORACIC SURGERY (CARDIOTHORACIC VASCULAR SURGERY)

## 2025-04-22 PROCEDURE — 37799 UNLISTED PX VASCULAR SURGERY: CPT | Performed by: NURSE PRACTITIONER

## 2025-04-22 PROCEDURE — C1751 CATH, INF, PER/CENT/MIDLINE: HCPCS | Performed by: THORACIC SURGERY (CARDIOTHORACIC VASCULAR SURGERY)

## 2025-04-22 PROCEDURE — 80048 BASIC METABOLIC PNL TOTAL CA: CPT | Performed by: NURSE PRACTITIONER

## 2025-04-22 PROCEDURE — 82330 ASSAY OF CALCIUM: CPT | Performed by: NURSE PRACTITIONER

## 2025-04-22 PROCEDURE — 2500000005 HC RX 250 GENERAL PHARMACY W/O HCPCS: Performed by: THORACIC SURGERY (CARDIOTHORACIC VASCULAR SURGERY)

## 2025-04-22 PROCEDURE — 83735 ASSAY OF MAGNESIUM: CPT | Performed by: NURSE PRACTITIONER

## 2025-04-22 PROCEDURE — 2500000001 HC RX 250 WO HCPCS SELF ADMINISTERED DRUGS (ALT 637 FOR MEDICARE OP): Performed by: NURSE PRACTITIONER

## 2025-04-22 PROCEDURE — 8E0W4CZ ROBOTIC ASSISTED PROCEDURE OF TRUNK REGION, PERCUTANEOUS ENDOSCOPIC APPROACH: ICD-10-PCS | Performed by: THORACIC SURGERY (CARDIOTHORACIC VASCULAR SURGERY)

## 2025-04-22 PROCEDURE — 93005 ELECTROCARDIOGRAM TRACING: CPT

## 2025-04-22 PROCEDURE — 88305 TISSUE EXAM BY PATHOLOGIST: CPT | Performed by: STUDENT IN AN ORGANIZED HEALTH CARE EDUCATION/TRAINING PROGRAM

## 2025-04-22 PROCEDURE — 84100 ASSAY OF PHOSPHORUS: CPT | Performed by: NURSE PRACTITIONER

## 2025-04-22 PROCEDURE — 7100000002 HC RECOVERY ROOM TIME - EACH INCREMENTAL 1 MINUTE: Performed by: THORACIC SURGERY (CARDIOTHORACIC VASCULAR SURGERY)

## 2025-04-22 PROCEDURE — 71045 X-RAY EXAM CHEST 1 VIEW: CPT

## 2025-04-22 PROCEDURE — 32663 THORACOSCOPY W/LOBECTOMY: CPT | Performed by: PHYSICIAN ASSISTANT

## 2025-04-22 PROCEDURE — 3700000002 HC GENERAL ANESTHESIA TIME - EACH INCREMENTAL 1 MINUTE: Performed by: THORACIC SURGERY (CARDIOTHORACIC VASCULAR SURGERY)

## 2025-04-22 PROCEDURE — 85027 COMPLETE CBC AUTOMATED: CPT | Performed by: NURSE PRACTITIONER

## 2025-04-22 PROCEDURE — 2720000007 HC OR 272 NO HCPCS: Performed by: THORACIC SURGERY (CARDIOTHORACIC VASCULAR SURGERY)

## 2025-04-22 RX ORDER — POTASSIUM CHLORIDE 20 MEQ/1
20 TABLET, EXTENDED RELEASE ORAL EVERY 6 HOURS PRN
Status: DISCONTINUED | OUTPATIENT
Start: 2025-04-22 | End: 2025-04-23 | Stop reason: HOSPADM

## 2025-04-22 RX ORDER — ONDANSETRON HYDROCHLORIDE 2 MG/ML
INJECTION, SOLUTION INTRAVENOUS AS NEEDED
Status: DISCONTINUED | OUTPATIENT
Start: 2025-04-22 | End: 2025-04-22

## 2025-04-22 RX ORDER — IPRATROPIUM BROMIDE AND ALBUTEROL SULFATE 2.5; .5 MG/3ML; MG/3ML
3 SOLUTION RESPIRATORY (INHALATION)
Status: DISCONTINUED | OUTPATIENT
Start: 2025-04-22 | End: 2025-04-23 | Stop reason: HOSPADM

## 2025-04-22 RX ORDER — HEPARIN SODIUM 5000 [USP'U]/ML
5000 INJECTION, SOLUTION INTRAVENOUS; SUBCUTANEOUS ONCE
Status: COMPLETED | OUTPATIENT
Start: 2025-04-22 | End: 2025-04-22

## 2025-04-22 RX ORDER — ACETAMINOPHEN 325 MG/1
650 TABLET ORAL ONCE
Status: COMPLETED | OUTPATIENT
Start: 2025-04-22 | End: 2025-04-22

## 2025-04-22 RX ORDER — POLYETHYLENE GLYCOL 3350 17 G/17G
17 POWDER, FOR SOLUTION ORAL DAILY PRN
Status: DISCONTINUED | OUTPATIENT
Start: 2025-04-22 | End: 2025-04-23 | Stop reason: HOSPADM

## 2025-04-22 RX ORDER — ALBUMIN HUMAN 50 G/1000ML
12.5 SOLUTION INTRAVENOUS AS NEEDED
Status: DISCONTINUED | OUTPATIENT
Start: 2025-04-22 | End: 2025-04-23 | Stop reason: HOSPADM

## 2025-04-22 RX ORDER — ONDANSETRON HYDROCHLORIDE 2 MG/ML
4 INJECTION, SOLUTION INTRAVENOUS EVERY 8 HOURS PRN
Status: DISCONTINUED | OUTPATIENT
Start: 2025-04-22 | End: 2025-04-23 | Stop reason: HOSPADM

## 2025-04-22 RX ORDER — SODIUM CHLORIDE 0.9 G/100ML
INJECTION, SOLUTION IRRIGATION AS NEEDED
Status: DISCONTINUED | OUTPATIENT
Start: 2025-04-22 | End: 2025-04-22 | Stop reason: HOSPADM

## 2025-04-22 RX ORDER — POTASSIUM CHLORIDE 1.5 G/1.58G
20 POWDER, FOR SOLUTION ORAL EVERY 6 HOURS PRN
Status: DISCONTINUED | OUTPATIENT
Start: 2025-04-22 | End: 2025-04-23 | Stop reason: HOSPADM

## 2025-04-22 RX ORDER — IPRATROPIUM BROMIDE AND ALBUTEROL SULFATE 2.5; .5 MG/3ML; MG/3ML
3 SOLUTION RESPIRATORY (INHALATION)
Status: DISCONTINUED | OUTPATIENT
Start: 2025-04-22 | End: 2025-04-22

## 2025-04-22 RX ORDER — HYDROMORPHONE HYDROCHLORIDE 1 MG/ML
INJECTION, SOLUTION INTRAMUSCULAR; INTRAVENOUS; SUBCUTANEOUS
Status: COMPLETED
Start: 2025-04-22 | End: 2025-04-22

## 2025-04-22 RX ORDER — PROPOFOL 10 MG/ML
INJECTION, EMULSION INTRAVENOUS AS NEEDED
Status: DISCONTINUED | OUTPATIENT
Start: 2025-04-22 | End: 2025-04-22

## 2025-04-22 RX ORDER — METHOCARBAMOL 500 MG/1
500 TABLET, FILM COATED ORAL EVERY 8 HOURS SCHEDULED
Status: DISCONTINUED | OUTPATIENT
Start: 2025-04-22 | End: 2025-04-23 | Stop reason: HOSPADM

## 2025-04-22 RX ORDER — BUPIVACAINE HYDROCHLORIDE 2.5 MG/ML
INJECTION, SOLUTION INFILTRATION; PERINEURAL AS NEEDED
Status: DISCONTINUED | OUTPATIENT
Start: 2025-04-22 | End: 2025-04-22 | Stop reason: HOSPADM

## 2025-04-22 RX ORDER — MEPERIDINE HYDROCHLORIDE 50 MG/ML
12.5 INJECTION INTRAMUSCULAR; INTRAVENOUS; SUBCUTANEOUS EVERY 10 MIN PRN
Status: DISCONTINUED | OUTPATIENT
Start: 2025-04-22 | End: 2025-04-22 | Stop reason: HOSPADM

## 2025-04-22 RX ORDER — FENTANYL CITRATE 50 UG/ML
25 INJECTION, SOLUTION INTRAMUSCULAR; INTRAVENOUS
Status: DISCONTINUED | OUTPATIENT
Start: 2025-04-22 | End: 2025-04-23 | Stop reason: HOSPADM

## 2025-04-22 RX ORDER — POTASSIUM CHLORIDE 1.5 G/1.58G
40 POWDER, FOR SOLUTION ORAL EVERY 6 HOURS PRN
Status: DISCONTINUED | OUTPATIENT
Start: 2025-04-22 | End: 2025-04-23 | Stop reason: HOSPADM

## 2025-04-22 RX ORDER — METOCLOPRAMIDE HYDROCHLORIDE 5 MG/ML
10 INJECTION INTRAMUSCULAR; INTRAVENOUS EVERY 6 HOURS PRN
Status: DISCONTINUED | OUTPATIENT
Start: 2025-04-22 | End: 2025-04-23 | Stop reason: HOSPADM

## 2025-04-22 RX ORDER — POTASSIUM CHLORIDE 14.9 MG/ML
20 INJECTION INTRAVENOUS
Status: DISCONTINUED | OUTPATIENT
Start: 2025-04-22 | End: 2025-04-23 | Stop reason: HOSPADM

## 2025-04-22 RX ORDER — ONDANSETRON HYDROCHLORIDE 2 MG/ML
4 INJECTION, SOLUTION INTRAVENOUS ONCE AS NEEDED
Status: DISCONTINUED | OUTPATIENT
Start: 2025-04-22 | End: 2025-04-22 | Stop reason: HOSPADM

## 2025-04-22 RX ORDER — LANOLIN ALCOHOL/MO/W.PET/CERES
400 CREAM (GRAM) TOPICAL DAILY
Status: DISCONTINUED | OUTPATIENT
Start: 2025-04-22 | End: 2025-04-23 | Stop reason: HOSPADM

## 2025-04-22 RX ORDER — LIDOCAINE HYDROCHLORIDE 20 MG/ML
INJECTION, SOLUTION EPIDURAL; INFILTRATION; INTRACAUDAL; PERINEURAL AS NEEDED
Status: DISCONTINUED | OUTPATIENT
Start: 2025-04-22 | End: 2025-04-22

## 2025-04-22 RX ORDER — CEFAZOLIN SODIUM 2 G/100ML
2 INJECTION, SOLUTION INTRAVENOUS ONCE
Status: COMPLETED | OUTPATIENT
Start: 2025-04-22 | End: 2025-04-22

## 2025-04-22 RX ORDER — ASPIRIN 81 MG/1
81 TABLET ORAL DAILY
Status: DISCONTINUED | OUTPATIENT
Start: 2025-04-22 | End: 2025-04-23 | Stop reason: HOSPADM

## 2025-04-22 RX ORDER — SIMVASTATIN 20 MG/1
40 TABLET, FILM COATED ORAL NIGHTLY
Status: DISCONTINUED | OUTPATIENT
Start: 2025-04-22 | End: 2025-04-23 | Stop reason: HOSPADM

## 2025-04-22 RX ORDER — METOCLOPRAMIDE 10 MG/1
10 TABLET ORAL EVERY 6 HOURS PRN
Status: DISCONTINUED | OUTPATIENT
Start: 2025-04-22 | End: 2025-04-23 | Stop reason: HOSPADM

## 2025-04-22 RX ORDER — LANOLIN ALCOHOL/MO/W.PET/CERES
1000 CREAM (GRAM) TOPICAL DAILY
Status: DISCONTINUED | OUTPATIENT
Start: 2025-04-22 | End: 2025-04-23 | Stop reason: HOSPADM

## 2025-04-22 RX ORDER — METOPROLOL TARTRATE 25 MG/1
12.5 TABLET, FILM COATED ORAL 2 TIMES DAILY
Status: DISCONTINUED | OUTPATIENT
Start: 2025-04-22 | End: 2025-04-23 | Stop reason: HOSPADM

## 2025-04-22 RX ORDER — HYDRALAZINE HYDROCHLORIDE 20 MG/ML
10 INJECTION INTRAMUSCULAR; INTRAVENOUS EVERY 30 MIN PRN
Status: DISCONTINUED | OUTPATIENT
Start: 2025-04-22 | End: 2025-04-22 | Stop reason: HOSPADM

## 2025-04-22 RX ORDER — MAGNESIUM SULFATE HEPTAHYDRATE 40 MG/ML
4 INJECTION, SOLUTION INTRAVENOUS EVERY 6 HOURS PRN
Status: DISCONTINUED | OUTPATIENT
Start: 2025-04-22 | End: 2025-04-23 | Stop reason: HOSPADM

## 2025-04-22 RX ORDER — ACETAMINOPHEN 500 MG
5 TABLET ORAL NIGHTLY
Status: DISCONTINUED | OUTPATIENT
Start: 2025-04-22 | End: 2025-04-23 | Stop reason: HOSPADM

## 2025-04-22 RX ORDER — WATER 1 ML/ML
INJECTION IRRIGATION AS NEEDED
Status: DISCONTINUED | OUTPATIENT
Start: 2025-04-22 | End: 2025-04-22 | Stop reason: HOSPADM

## 2025-04-22 RX ORDER — HYDROMORPHONE HYDROCHLORIDE 1 MG/ML
1 INJECTION, SOLUTION INTRAMUSCULAR; INTRAVENOUS; SUBCUTANEOUS EVERY 5 MIN PRN
Status: DISCONTINUED | OUTPATIENT
Start: 2025-04-22 | End: 2025-04-22 | Stop reason: HOSPADM

## 2025-04-22 RX ORDER — SODIUM CHLORIDE, SODIUM GLUCONATE, SODIUM ACETATE, POTASSIUM CHLORIDE AND MAGNESIUM CHLORIDE 30; 37; 368; 526; 502 MG/100ML; MG/100ML; MG/100ML; MG/100ML; MG/100ML
1000 INJECTION, SOLUTION INTRAVENOUS AS NEEDED
Status: DISCONTINUED | OUTPATIENT
Start: 2025-04-22 | End: 2025-04-23 | Stop reason: HOSPADM

## 2025-04-22 RX ORDER — DOXAZOSIN 4 MG/1
4 TABLET ORAL DAILY
Status: DISCONTINUED | OUTPATIENT
Start: 2025-04-22 | End: 2025-04-23 | Stop reason: HOSPADM

## 2025-04-22 RX ORDER — AMOXICILLIN 250 MG
2 CAPSULE ORAL 2 TIMES DAILY
Status: DISCONTINUED | OUTPATIENT
Start: 2025-04-22 | End: 2025-04-23 | Stop reason: HOSPADM

## 2025-04-22 RX ORDER — ACETAMINOPHEN 325 MG/1
650 TABLET ORAL EVERY 4 HOURS PRN
Status: DISCONTINUED | OUTPATIENT
Start: 2025-04-22 | End: 2025-04-22 | Stop reason: HOSPADM

## 2025-04-22 RX ORDER — GABAPENTIN 300 MG/1
300 CAPSULE ORAL ONCE
Status: COMPLETED | OUTPATIENT
Start: 2025-04-22 | End: 2025-04-22

## 2025-04-22 RX ORDER — OXYCODONE HYDROCHLORIDE 5 MG/1
5 TABLET ORAL EVERY 6 HOURS PRN
Status: DISCONTINUED | OUTPATIENT
Start: 2025-04-22 | End: 2025-04-23 | Stop reason: HOSPADM

## 2025-04-22 RX ORDER — DIPHENHYDRAMINE HYDROCHLORIDE 50 MG/ML
12.5 INJECTION, SOLUTION INTRAMUSCULAR; INTRAVENOUS ONCE AS NEEDED
Status: DISCONTINUED | OUTPATIENT
Start: 2025-04-22 | End: 2025-04-22 | Stop reason: HOSPADM

## 2025-04-22 RX ORDER — LABETALOL HYDROCHLORIDE 5 MG/ML
10 INJECTION, SOLUTION INTRAVENOUS ONCE AS NEEDED
Status: COMPLETED | OUTPATIENT
Start: 2025-04-22 | End: 2025-04-22

## 2025-04-22 RX ORDER — BISMUTH SUBSALICYLATE 262 MG
1 TABLET,CHEWABLE ORAL DAILY
Status: DISCONTINUED | OUTPATIENT
Start: 2025-04-22 | End: 2025-04-22

## 2025-04-22 RX ORDER — HYDROMORPHONE HYDROCHLORIDE 1 MG/ML
INJECTION, SOLUTION INTRAMUSCULAR; INTRAVENOUS; SUBCUTANEOUS AS NEEDED
Status: DISCONTINUED | OUTPATIENT
Start: 2025-04-22 | End: 2025-04-22

## 2025-04-22 RX ORDER — ASCORBIC ACID 250 MG
500 TABLET ORAL DAILY
Status: DISCONTINUED | OUTPATIENT
Start: 2025-04-22 | End: 2025-04-23 | Stop reason: HOSPADM

## 2025-04-22 RX ORDER — SODIUM CHLORIDE, SODIUM LACTATE, POTASSIUM CHLORIDE, CALCIUM CHLORIDE 600; 310; 30; 20 MG/100ML; MG/100ML; MG/100ML; MG/100ML
100 INJECTION, SOLUTION INTRAVENOUS CONTINUOUS
Status: DISCONTINUED | OUTPATIENT
Start: 2025-04-22 | End: 2025-04-22

## 2025-04-22 RX ORDER — FENTANYL CITRATE 50 UG/ML
INJECTION, SOLUTION INTRAMUSCULAR; INTRAVENOUS AS NEEDED
Status: DISCONTINUED | OUTPATIENT
Start: 2025-04-22 | End: 2025-04-22

## 2025-04-22 RX ORDER — OXYCODONE HYDROCHLORIDE 5 MG/1
10 TABLET ORAL EVERY 6 HOURS PRN
Status: DISCONTINUED | OUTPATIENT
Start: 2025-04-22 | End: 2025-04-23 | Stop reason: HOSPADM

## 2025-04-22 RX ORDER — POTASSIUM CHLORIDE 20 MEQ/1
40 TABLET, EXTENDED RELEASE ORAL EVERY 6 HOURS PRN
Status: DISCONTINUED | OUTPATIENT
Start: 2025-04-22 | End: 2025-04-23 | Stop reason: HOSPADM

## 2025-04-22 RX ORDER — ROCURONIUM BROMIDE 10 MG/ML
INJECTION, SOLUTION INTRAVENOUS AS NEEDED
Status: DISCONTINUED | OUTPATIENT
Start: 2025-04-22 | End: 2025-04-22

## 2025-04-22 RX ORDER — NALOXONE HYDROCHLORIDE 1 MG/ML
0.2 INJECTION INTRAMUSCULAR; INTRAVENOUS; SUBCUTANEOUS EVERY 5 MIN PRN
Status: DISCONTINUED | OUTPATIENT
Start: 2025-04-22 | End: 2025-04-23 | Stop reason: HOSPADM

## 2025-04-22 RX ORDER — MULTIVIT-MIN/IRON FUM/FOLIC AC 7.5 MG-4
1 TABLET ORAL DAILY
Status: DISCONTINUED | OUTPATIENT
Start: 2025-04-22 | End: 2025-04-23 | Stop reason: HOSPADM

## 2025-04-22 RX ORDER — GABAPENTIN 100 MG/1
100 CAPSULE ORAL 3 TIMES DAILY
Status: DISCONTINUED | OUTPATIENT
Start: 2025-04-22 | End: 2025-04-23 | Stop reason: HOSPADM

## 2025-04-22 RX ORDER — ENOXAPARIN SODIUM 100 MG/ML
40 INJECTION SUBCUTANEOUS DAILY
Status: DISCONTINUED | OUTPATIENT
Start: 2025-04-23 | End: 2025-04-23 | Stop reason: HOSPADM

## 2025-04-22 RX ORDER — ONDANSETRON 4 MG/1
4 TABLET, ORALLY DISINTEGRATING ORAL EVERY 8 HOURS PRN
Status: DISCONTINUED | OUTPATIENT
Start: 2025-04-22 | End: 2025-04-23 | Stop reason: HOSPADM

## 2025-04-22 RX ORDER — MAGNESIUM SULFATE HEPTAHYDRATE 40 MG/ML
2 INJECTION, SOLUTION INTRAVENOUS EVERY 6 HOURS PRN
Status: DISCONTINUED | OUTPATIENT
Start: 2025-04-22 | End: 2025-04-23 | Stop reason: HOSPADM

## 2025-04-22 RX ADMIN — SENNOSIDES AND DOCUSATE SODIUM 2 TABLET: 50; 8.6 TABLET ORAL at 20:34

## 2025-04-22 RX ADMIN — METHOCARBAMOL 500 MG: 500 TABLET ORAL at 13:17

## 2025-04-22 RX ADMIN — FENTANYL CITRATE 25 MCG: 50 INJECTION, SOLUTION INTRAMUSCULAR; INTRAVENOUS at 09:27

## 2025-04-22 RX ADMIN — ROCURONIUM BROMIDE 50 MG: 50 INJECTION, SOLUTION INTRAVENOUS at 07:42

## 2025-04-22 RX ADMIN — SODIUM CHLORIDE, POTASSIUM CHLORIDE, SODIUM LACTATE AND CALCIUM CHLORIDE 100 ML/HR: 600; 310; 30; 20 INJECTION, SOLUTION INTRAVENOUS at 10:19

## 2025-04-22 RX ADMIN — METOPROLOL TARTRATE 12.5 MG: 25 TABLET, FILM COATED ORAL at 20:32

## 2025-04-22 RX ADMIN — ASPIRIN 81 MG: 81 TABLET, COATED ORAL at 11:54

## 2025-04-22 RX ADMIN — CYANOCOBALAMIN TAB 1000 MCG 1000 MCG: 1000 TAB at 11:54

## 2025-04-22 RX ADMIN — DOXAZOSIN 4 MG: 4 TABLET ORAL at 11:54

## 2025-04-22 RX ADMIN — FENTANYL CITRATE 25 MCG: 50 INJECTION, SOLUTION INTRAMUSCULAR; INTRAVENOUS at 09:18

## 2025-04-22 RX ADMIN — DEXAMETHASONE SODIUM PHOSPHATE 8 MG: 4 INJECTION, SOLUTION INTRAMUSCULAR; INTRAVENOUS at 07:42

## 2025-04-22 RX ADMIN — IPRATROPIUM BROMIDE AND ALBUTEROL SULFATE 3 ML: .5; 3 SOLUTION RESPIRATORY (INHALATION) at 18:53

## 2025-04-22 RX ADMIN — FENTANYL CITRATE 25 MCG: 50 INJECTION INTRAMUSCULAR; INTRAVENOUS at 17:49

## 2025-04-22 RX ADMIN — MAGNESIUM OXIDE TAB 400 MG (241.3 MG ELEMENTAL MG) 400 MG: 400 (241.3 MG) TAB at 11:54

## 2025-04-22 RX ADMIN — FENTANYL CITRATE 50 MCG: 50 INJECTION, SOLUTION INTRAMUSCULAR; INTRAVENOUS at 08:07

## 2025-04-22 RX ADMIN — HYDROMORPHONE HYDROCHLORIDE 1 MG: 1 INJECTION, SOLUTION INTRAMUSCULAR; INTRAVENOUS; SUBCUTANEOUS at 09:50

## 2025-04-22 RX ADMIN — FENTANYL CITRATE 50 MCG: 50 INJECTION, SOLUTION INTRAMUSCULAR; INTRAVENOUS at 07:42

## 2025-04-22 RX ADMIN — Medication 2 L/MIN: at 11:15

## 2025-04-22 RX ADMIN — ACETAMINOPHEN 650 MG: 325 TABLET, FILM COATED ORAL at 07:10

## 2025-04-22 RX ADMIN — LABETALOL HYDROCHLORIDE 10 MG: 5 INJECTION INTRAVENOUS at 10:11

## 2025-04-22 RX ADMIN — METHOCARBAMOL 500 MG: 500 TABLET ORAL at 20:36

## 2025-04-22 RX ADMIN — Medication 1 TABLET: at 11:54

## 2025-04-22 RX ADMIN — GABAPENTIN 300 MG: 300 CAPSULE ORAL at 07:10

## 2025-04-22 RX ADMIN — HEPARIN SODIUM 5000 UNITS: 5000 INJECTION, SOLUTION INTRAVENOUS; SUBCUTANEOUS at 07:10

## 2025-04-22 RX ADMIN — Medication 500 MG: at 11:54

## 2025-04-22 RX ADMIN — CEFAZOLIN SODIUM 2 G: 2 INJECTION, SOLUTION INTRAVENOUS at 08:00

## 2025-04-22 RX ADMIN — GABAPENTIN 100 MG: 100 CAPSULE ORAL at 12:03

## 2025-04-22 RX ADMIN — PROPOFOL 200 MG: 10 INJECTION, EMULSION INTRAVENOUS at 07:42

## 2025-04-22 RX ADMIN — ROCURONIUM BROMIDE 20 MG: 50 INJECTION, SOLUTION INTRAVENOUS at 08:09

## 2025-04-22 RX ADMIN — GABAPENTIN 100 MG: 100 CAPSULE ORAL at 20:34

## 2025-04-22 RX ADMIN — ROCURONIUM BROMIDE 20 MG: 50 INJECTION, SOLUTION INTRAVENOUS at 08:45

## 2025-04-22 RX ADMIN — HYDROMORPHONE HYDROCHLORIDE 0.5 MG: 1 INJECTION, SOLUTION INTRAMUSCULAR; INTRAVENOUS; SUBCUTANEOUS at 10:17

## 2025-04-22 RX ADMIN — HYDROMORPHONE HYDROCHLORIDE 1 MG: 1 INJECTION, SOLUTION INTRAMUSCULAR; INTRAVENOUS; SUBCUTANEOUS at 09:41

## 2025-04-22 RX ADMIN — OXYCODONE HYDROCHLORIDE 5 MG: 5 TABLET ORAL at 13:17

## 2025-04-22 RX ADMIN — SUGAMMADEX 100 MG: 100 INJECTION, SOLUTION INTRAVENOUS at 09:27

## 2025-04-22 RX ADMIN — ONDANSETRON 4 MG: 2 INJECTION, SOLUTION INTRAMUSCULAR; INTRAVENOUS at 09:11

## 2025-04-22 RX ADMIN — IPRATROPIUM BROMIDE AND ALBUTEROL SULFATE 3 ML: .5; 3 SOLUTION RESPIRATORY (INHALATION) at 13:13

## 2025-04-22 RX ADMIN — SUGAMMADEX 100 MG: 100 INJECTION, SOLUTION INTRAVENOUS at 09:31

## 2025-04-22 RX ADMIN — Medication 5 MG: at 20:32

## 2025-04-22 RX ADMIN — FENTANYL CITRATE 25 MCG: 50 INJECTION INTRAMUSCULAR; INTRAVENOUS at 16:01

## 2025-04-22 RX ADMIN — SENNOSIDES AND DOCUSATE SODIUM 2 TABLET: 50; 8.6 TABLET ORAL at 11:54

## 2025-04-22 RX ADMIN — LIDOCAINE HYDROCHLORIDE 80 MG: 20 INJECTION, SOLUTION EPIDURAL; INFILTRATION; INTRACAUDAL; PERINEURAL at 07:42

## 2025-04-22 RX ADMIN — SIMVASTATIN 40 MG: 20 TABLET, FILM COATED ORAL at 20:32

## 2025-04-22 RX ADMIN — FENTANYL CITRATE 50 MCG: 50 INJECTION, SOLUTION INTRAMUSCULAR; INTRAVENOUS at 07:54

## 2025-04-22 RX ADMIN — POVIDONE-IODINE 1 APPLICATION: 5 SOLUTION TOPICAL at 07:14

## 2025-04-22 RX ADMIN — SODIUM CHLORIDE, SODIUM LACTATE, POTASSIUM CHLORIDE, AND CALCIUM CHLORIDE: .6; .31; .03; .02 INJECTION, SOLUTION INTRAVENOUS at 07:33

## 2025-04-22 RX ADMIN — OXYCODONE HYDROCHLORIDE 10 MG: 5 TABLET ORAL at 20:35

## 2025-04-22 SDOH — SOCIAL STABILITY: SOCIAL INSECURITY
WITHIN THE LAST YEAR, HAVE YOU BEEN RAPED OR FORCED TO HAVE ANY KIND OF SEXUAL ACTIVITY BY YOUR PARTNER OR EX-PARTNER?: NO

## 2025-04-22 SDOH — ECONOMIC STABILITY: FOOD INSECURITY: WITHIN THE PAST 12 MONTHS, YOU WORRIED THAT YOUR FOOD WOULD RUN OUT BEFORE YOU GOT THE MONEY TO BUY MORE.: NEVER TRUE

## 2025-04-22 SDOH — HEALTH STABILITY: MENTAL HEALTH: HOW MANY DRINKS CONTAINING ALCOHOL DO YOU HAVE ON A TYPICAL DAY WHEN YOU ARE DRINKING?: 1 OR 2

## 2025-04-22 SDOH — ECONOMIC STABILITY: INCOME INSECURITY: IN THE PAST 12 MONTHS HAS THE ELECTRIC, GAS, OIL, OR WATER COMPANY THREATENED TO SHUT OFF SERVICES IN YOUR HOME?: NO

## 2025-04-22 SDOH — ECONOMIC STABILITY: HOUSING INSECURITY: AT ANY TIME IN THE PAST 12 MONTHS, WERE YOU HOMELESS OR LIVING IN A SHELTER (INCLUDING NOW)?: NO

## 2025-04-22 SDOH — SOCIAL STABILITY: SOCIAL INSECURITY
WITHIN THE LAST YEAR, HAVE YOU BEEN KICKED, HIT, SLAPPED, OR OTHERWISE PHYSICALLY HURT BY YOUR PARTNER OR EX-PARTNER?: NO

## 2025-04-22 SDOH — SOCIAL STABILITY: SOCIAL INSECURITY: WITHIN THE LAST YEAR, HAVE YOU BEEN HUMILIATED OR EMOTIONALLY ABUSED IN OTHER WAYS BY YOUR PARTNER OR EX-PARTNER?: NO

## 2025-04-22 SDOH — ECONOMIC STABILITY: FOOD INSECURITY: HOW HARD IS IT FOR YOU TO PAY FOR THE VERY BASICS LIKE FOOD, HOUSING, MEDICAL CARE, AND HEATING?: NOT HARD AT ALL

## 2025-04-22 SDOH — ECONOMIC STABILITY: FOOD INSECURITY: WITHIN THE PAST 12 MONTHS, THE FOOD YOU BOUGHT JUST DIDN'T LAST AND YOU DIDN'T HAVE MONEY TO GET MORE.: NEVER TRUE

## 2025-04-22 SDOH — SOCIAL STABILITY: SOCIAL INSECURITY: WITHIN THE LAST YEAR, HAVE YOU BEEN AFRAID OF YOUR PARTNER OR EX-PARTNER?: NO

## 2025-04-22 SDOH — SOCIAL STABILITY: SOCIAL INSECURITY: HAVE YOU HAD ANY THOUGHTS OF HARMING ANYONE ELSE?: NO

## 2025-04-22 SDOH — ECONOMIC STABILITY: HOUSING INSECURITY: IN THE LAST 12 MONTHS, WAS THERE A TIME WHEN YOU WERE NOT ABLE TO PAY THE MORTGAGE OR RENT ON TIME?: NO

## 2025-04-22 SDOH — HEALTH STABILITY: PHYSICAL HEALTH
HOW OFTEN DO YOU NEED TO HAVE SOMEONE HELP YOU WHEN YOU READ INSTRUCTIONS, PAMPHLETS, OR OTHER WRITTEN MATERIAL FROM YOUR DOCTOR OR PHARMACY?: NEVER

## 2025-04-22 SDOH — HEALTH STABILITY: PHYSICAL HEALTH: ON AVERAGE, HOW MANY DAYS PER WEEK DO YOU ENGAGE IN MODERATE TO STRENUOUS EXERCISE (LIKE A BRISK WALK)?: 3 DAYS

## 2025-04-22 SDOH — SOCIAL STABILITY: SOCIAL INSECURITY: DO YOU FEEL ANYONE HAS EXPLOITED OR TAKEN ADVANTAGE OF YOU FINANCIALLY OR OF YOUR PERSONAL PROPERTY?: NO

## 2025-04-22 SDOH — SOCIAL STABILITY: SOCIAL INSECURITY: ARE THERE ANY APPARENT SIGNS OF INJURIES/BEHAVIORS THAT COULD BE RELATED TO ABUSE/NEGLECT?: NO

## 2025-04-22 SDOH — HEALTH STABILITY: MENTAL HEALTH: HOW OFTEN DO YOU HAVE SIX OR MORE DRINKS ON ONE OCCASION?: LESS THAN MONTHLY

## 2025-04-22 SDOH — ECONOMIC STABILITY: TRANSPORTATION INSECURITY: IN THE PAST 12 MONTHS, HAS LACK OF TRANSPORTATION KEPT YOU FROM MEDICAL APPOINTMENTS OR FROM GETTING MEDICATIONS?: NO

## 2025-04-22 SDOH — HEALTH STABILITY: MENTAL HEALTH: HOW OFTEN DO YOU HAVE A DRINK CONTAINING ALCOHOL?: MONTHLY OR LESS

## 2025-04-22 SDOH — ECONOMIC STABILITY: HOUSING INSECURITY: IN THE PAST 12 MONTHS, HOW MANY TIMES HAVE YOU MOVED WHERE YOU WERE LIVING?: 0

## 2025-04-22 SDOH — SOCIAL STABILITY: SOCIAL INSECURITY: HAS ANYONE EVER THREATENED TO HURT YOUR FAMILY OR YOUR PETS?: NO

## 2025-04-22 SDOH — HEALTH STABILITY: MENTAL HEALTH: CURRENT SMOKER: 0

## 2025-04-22 SDOH — SOCIAL STABILITY: SOCIAL INSECURITY: ABUSE: ADULT

## 2025-04-22 SDOH — SOCIAL STABILITY: SOCIAL INSECURITY: HAVE YOU HAD THOUGHTS OF HARMING ANYONE ELSE?: NO

## 2025-04-22 SDOH — ECONOMIC STABILITY: HOUSING INSECURITY: DO YOU FEEL UNSAFE GOING BACK TO THE PLACE WHERE YOU LIVE?: NO

## 2025-04-22 SDOH — HEALTH STABILITY: PHYSICAL HEALTH: ON AVERAGE, HOW MANY MINUTES DO YOU ENGAGE IN EXERCISE AT THIS LEVEL?: 30 MIN

## 2025-04-22 SDOH — SOCIAL STABILITY: SOCIAL INSECURITY: DO YOU FEEL UNSAFE GOING BACK TO THE PLACE WHERE YOU ARE LIVING?: NO

## 2025-04-22 SDOH — SOCIAL STABILITY: SOCIAL INSECURITY: DOES ANYONE TRY TO KEEP YOU FROM HAVING/CONTACTING OTHER FRIENDS OR DOING THINGS OUTSIDE YOUR HOME?: NO

## 2025-04-22 SDOH — SOCIAL STABILITY: SOCIAL INSECURITY: ARE YOU OR HAVE YOU BEEN THREATENED OR ABUSED PHYSICALLY, EMOTIONALLY, OR SEXUALLY BY ANYONE?: NO

## 2025-04-22 SDOH — SOCIAL STABILITY: SOCIAL INSECURITY: WERE YOU ABLE TO COMPLETE ALL THE BEHAVIORAL HEALTH SCREENINGS?: YES

## 2025-04-22 ASSESSMENT — COLUMBIA-SUICIDE SEVERITY RATING SCALE - C-SSRS
6. HAVE YOU EVER DONE ANYTHING, STARTED TO DO ANYTHING, OR PREPARED TO DO ANYTHING TO END YOUR LIFE?: NO
2. HAVE YOU ACTUALLY HAD ANY THOUGHTS OF KILLING YOURSELF?: NO
1. IN THE PAST MONTH, HAVE YOU WISHED YOU WERE DEAD OR WISHED YOU COULD GO TO SLEEP AND NOT WAKE UP?: NO

## 2025-04-22 ASSESSMENT — PAIN DESCRIPTION - ORIENTATION
ORIENTATION: LEFT
ORIENTATION: RIGHT

## 2025-04-22 ASSESSMENT — ENCOUNTER SYMPTOMS
COUGH: 1
EYES NEGATIVE: 1
SHORTNESS OF BREATH: 0
HEMATOLOGIC/LYMPHATIC NEGATIVE: 1
PSYCHIATRIC NEGATIVE: 1
BLURRED VISION: 0
WOUND: 1
CONSTITUTIONAL NEGATIVE: 1
CHILLS: 0
CARDIOVASCULAR NEGATIVE: 1
IRREGULAR HEARTBEAT: 0
DIAPHORESIS: 0
DYSPNEA ON EXERTION: 0
NEUROLOGICAL NEGATIVE: 1
DOUBLE VISION: 0
GASTROINTESTINAL NEGATIVE: 1
ALLERGIC/IMMUNOLOGIC NEGATIVE: 1
ENDOCRINE NEGATIVE: 1
SHORTNESS OF BREATH: 1
BACK PAIN: 1
COUGH: 0

## 2025-04-22 ASSESSMENT — ACTIVITIES OF DAILY LIVING (ADL)
FEEDING YOURSELF: INDEPENDENT
GROOMING: INDEPENDENT
TOILETING: INDEPENDENT
WALKS IN HOME: INDEPENDENT
LACK_OF_TRANSPORTATION: NO
HEARING - RIGHT EAR: FUNCTIONAL
BATHING: INDEPENDENT
HEARING - LEFT EAR: FUNCTIONAL
LACK_OF_TRANSPORTATION: NO
LACK_OF_TRANSPORTATION: NO
DRESSING YOURSELF: INDEPENDENT
PATIENT'S MEMORY ADEQUATE TO SAFELY COMPLETE DAILY ACTIVITIES?: YES
JUDGMENT_ADEQUATE_SAFELY_COMPLETE_DAILY_ACTIVITIES: YES
ADEQUATE_TO_COMPLETE_ADL: YES

## 2025-04-22 ASSESSMENT — PAIN DESCRIPTION - LOCATION
LOCATION: RIB CAGE
LOCATION: SHOULDER
LOCATION: CHEST

## 2025-04-22 ASSESSMENT — PAIN - FUNCTIONAL ASSESSMENT
PAIN_FUNCTIONAL_ASSESSMENT: 0-10

## 2025-04-22 ASSESSMENT — PAIN SCALES - GENERAL
PAINLEVEL_OUTOF10: 8
PAINLEVEL_OUTOF10: 3
PAINLEVEL_OUTOF10: 9
PAIN_LEVEL: 8
PAINLEVEL_OUTOF10: 6
PAINLEVEL_OUTOF10: 4
PAINLEVEL_OUTOF10: 7
PAINLEVEL_OUTOF10: 0 - NO PAIN
PAINLEVEL_OUTOF10: 8
PAINLEVEL_OUTOF10: 4
PAINLEVEL_OUTOF10: 0 - NO PAIN
PAINLEVEL_OUTOF10: 6

## 2025-04-22 ASSESSMENT — COGNITIVE AND FUNCTIONAL STATUS - GENERAL
DAILY ACTIVITIY SCORE: 24
MOBILITY SCORE: 24
PATIENT BASELINE BEDBOUND: NO

## 2025-04-22 ASSESSMENT — PAIN DESCRIPTION - DESCRIPTORS: DESCRIPTORS: SORE

## 2025-04-22 ASSESSMENT — LIFESTYLE VARIABLES
PRESCIPTION_ABUSE_PAST_12_MONTHS: NO
HOW MANY STANDARD DRINKS CONTAINING ALCOHOL DO YOU HAVE ON A TYPICAL DAY: 1 OR 2
HOW OFTEN DO YOU HAVE 6 OR MORE DRINKS ON ONE OCCASION: LESS THAN MONTHLY
SKIP TO QUESTIONS 9-10: 0
HOW OFTEN DO YOU HAVE A DRINK CONTAINING ALCOHOL: MONTHLY OR LESS
SKIP TO QUESTIONS 9-10: 0
SUBSTANCE_ABUSE_PAST_12_MONTHS: NO
AUDIT-C TOTAL SCORE: 2

## 2025-04-22 ASSESSMENT — PATIENT HEALTH QUESTIONNAIRE - PHQ9
2. FEELING DOWN, DEPRESSED OR HOPELESS: NOT AT ALL
SUM OF ALL RESPONSES TO PHQ9 QUESTIONS 1 & 2: 0
1. LITTLE INTEREST OR PLEASURE IN DOING THINGS: NOT AT ALL

## 2025-04-22 NOTE — CARE PLAN
The patient's goals for the shift include      The clinical goals for the shift include      Over the shift, the patient did not make progress toward the following goals. Barriers to progression include pain. Recommendations to address these barriers include pain management  Problem: Pain - Adult  Goal: Verbalizes/displays adequate comfort level or baseline comfort level  Outcome: Progressing     Problem: Safety - Adult  Goal: Free from fall injury  Outcome: Progressing     Problem: Discharge Planning  Goal: Discharge to home or other facility with appropriate resources  Outcome: Progressing     Problem: Chronic Conditions and Co-morbidities  Goal: Patient's chronic conditions and co-morbidity symptoms are monitored and maintained or improved  Outcome: Progressing     Problem: Nutrition  Goal: Nutrient intake appropriate for maintaining nutritional needs  Outcome: Progressing     Problem: Respiratory  Goal: Clear secretions with interventions this shift  Outcome: Progressing  Goal: Minimize anxiety/maximize coping throughout shift  Outcome: Progressing  Goal: Minimal/no exertional discomfort or dyspnea this shift  Outcome: Progressing  Goal: No signs of respiratory distress (eg. Use of accessory muscles. Peds grunting)  Outcome: Progressing  Goal: Patent airway maintained this shift  Outcome: Progressing  Goal: Verbalize decreased shortness of breath this shift  Outcome: Progressing  Goal: Wean oxygen to maintain O2 saturation per order/standard this shift  Outcome: Progressing  Goal: Increase self care and/or family involvement in next 24 hours  Outcome: Progressing     Problem: Skin  Goal: Decreased wound size/increased tissue granulation at next dressing change  Outcome: Progressing  Goal: Participates in plan/prevention/treatment measures  Outcome: Progressing  Goal: Prevent/manage excess moisture  Outcome: Progressing  Goal: Prevent/minimize sheer/friction injuries  Outcome: Progressing  Goal: Promote/optimize  nutrition  Outcome: Progressing  Goal: Promote skin healing  Outcome: Progressing   .

## 2025-04-22 NOTE — CONSULTS
Consults    Reason For Consult  Med management     History Of Present Illness  Bebo Casillas is a 69 y.o. male presenting with with PMH adenocarcinoma, BPH, HLD, herpes zoster, colonic polyps, tobacco abuse for whom ICU was consulted status post right middle lobe lobectomy. Current vitals: /46, 96% O2 sat on room air, MSSA + nares, labs with Glc 142. He is resting quietly in bed and endorses some R shoulder pain. He states he has smoked 0.5 ppd for 40 years, he smokes cigars when he golfs, about 3 days per week. He has stopped smoking cigarettes. Critical care was consulted for post operative management.      Past Medical History  He has a past medical history of Benign prostatic hyperplasia (02/21/2025), Elevated low density lipoprotein (LDL) cholesterol level (02/21/2025), Herpes zoster (02/21/2025), Lung cancer (Multi), Occlusion of carotid artery (02/21/2025), Overweight with body mass index (BMI) 25.0-29.9 (02/21/2025), Personal history of colonic polyps (01/05/2017), Personal history of other diseases of the circulatory system, Personal history of other endocrine, nutritional and metabolic disease, and Vision loss.    Surgical History  He has a past surgical history that includes Cardiac catheterization and Colonoscopy.     Social History  He reports that he has quit smoking. His smoking use included cigars. He has never used smokeless tobacco. He reports current alcohol use of about 10.0 standard drinks of alcohol per week. He reports that he does not use drugs.    Family History  Family History[1]     Allergies  Patient has no known allergies.    Review of Systems   Constitutional: Negative.    HENT: Negative.     Eyes: Negative.    Respiratory:  Positive for cough and shortness of breath.    Cardiovascular: Negative.    Gastrointestinal: Negative.    Endocrine: Negative.    Genitourinary: Negative.    Musculoskeletal:  Positive for back pain.   Skin:  Positive for wound.   Allergic/Immunologic: Negative.     Neurological: Negative.    Hematological: Negative.    Psychiatric/Behavioral: Negative.          Physical Exam  Constitutional:       Appearance: Normal appearance.   HENT:      Head: Normocephalic and atraumatic.   Eyes:      Extraocular Movements: Extraocular movements intact.      Pupils: Pupils are equal, round, and reactive to light.   Cardiovascular:      Rate and Rhythm: Normal rate and regular rhythm.      Comments: Chest tube In place R chest wall   Pulmonary:      Effort: No respiratory distress.      Comments: 2L NC  Musculoskeletal:      Right lower leg: No edema.      Left lower leg: No edema.   Skin:     General: Skin is warm and dry.   Neurological:      Mental Status: He is alert.          Last Recorded Vitals  /83   Pulse 87   Temp 36.6 °C (97.9 °F) (Temporal)   Resp 10   Wt 84 kg (185 lb 3 oz)   SpO2 96%     Relevant Results  XR chest 1 view  Result Date: 4/22/2025  Interpreted By:  Musa Knutson, STUDY: XR CHEST 1 VIEW  4/22/2025 9:58 am   INDICATION: Signs/Symptoms:Right middle lobectomy   COMPARISON: 04/01/2025   ACCESSION NUMBER(S): [id number]   ORDERING CLINICIAN: LAURA CHARLES   TECHNIQUE: A single AP portable radiograph of the chest was obtained.   FINDINGS: Multiple cardiac monitoring leads are seen over the chest.  A right sided chest tube is present in situ. No focal infiltrate, pleural effusion or pneumothorax is identified. The cardiac silhouette is within normal limits for size.       1. Postoperative changes, as above. 2. No focal infiltrate or pneumothorax is identified.   MACRO: None.   Signed by: Musa Knutson 4/22/2025 11:59 AM Dictation workstation:   [id number]    ECG 12 lead  Result Date: 4/18/2025  Sinus rhythm with 1st degree AV block Possible Anterior infarct (cited on or before 16-APR-2025) Abnormal ECG When compared with ECG of 16-APR-2025 10:22, (unconfirmed) No significant change was found Confirmed by Juan Nelson ([id number]) on 4/18/2025 8:10:57 AM    XR  "chest 1 view  Result Date: 4/4/2025  Interpreted By:  Gregory Moss, STUDY: XR CHEST 1 VIEW;  4/1/2025 2:00 pm   INDICATION: Signs/Symptoms:post lung biopsy.   COMPARISON: none   ACCESSION NUMBER(S): [id number]   ORDERING CLINICIAN: HELEN MOSS   FINDINGS: Cardiomediastinal silhouette normal size and configuration. No sizeable effusion or pneumothorax status post biopsy of right middle lobe nodule.   IMPRESSION No sizable effusion or pneumothorax.     MACRO none   Signed by: Gregory Moss 4/4/2025 4:04 PM Dictation workstation:   [id number]    CT guided percutaneous biopsy lung  Result Date: 4/4/2025  Interpreted By:  Gregory Moss, STUDY: CT GUIDED PERCUTANEOUS BIOPSY LUNG;  4/1/2025 11:13 am   INDICATION: Signs/Symptoms:RML nodule - TO BE DONE BY DR MOSS.   ,R91.1 Solitary pulmonary nodule   COMPARISON: None.   ACCESSION NUMBER(S): [id number]   ORDERING CLINICIAN: AAKASH QUIJANO   TECHNIQUE: INTERVENTIONALIST(S): Gregory Moss MD   The history and physical exam pertinent to the procedure were reviewed and no updates were made.\"   CONSENT: The patient/patient's POA/next of kin was informed of the nature of the proposed procedure. The purposes, alternatives, risks, and benefits were explained and discussed. All questions were answered and consent was obtained.   RADIATION EXPOSURE: 624 mGy cm   SEDATION: Moderate conscious IV sedation services (supervision of administration, induction, and maintenance) were provided by the physician performing the procedure with intravenous fentanyl 50 mcg and versed 1 mGy for 45 minutes. The physician was assisted by an independent trained observer, an interventional radiology nurse, in the continuous monitoring of patient level of consciousness and physiologic status.   MEDICATION/CONTRAST: No additional   TIME OUT: A time out was performed immediately prior to procedure start with the interventional team, correctly identifying the patient name, date of birth, MRN, " procedure, anatomy (including marking of site and side), patient position, procedure consent form, relevant laboratory and imaging test results, antibiotic administration, safety precautions, and procedure-specific equipment needs.   COMPLICATIONS: No immediate adverse events identified.   FINDINGS:   Preliminary sequential axial CT images demonstrate right lower lobe nodule. The optimal percutaneous access site for optimal safe trajectory was marked. The overlying soft tissues were prepped and draped in the usual sterile fashion. The superficial and deep subcutaneous soft tissues were anesthetized using 1% lidocaine using 25 gauge and 22 gauge needles. Then, using guidance of intermittent axial CT acquisitions, a19 gauge coaxial cannula was advanced into the lesion. The inner stylet was removed. Then, a 20 gauge coaxial cutting core biopsy needle was advanced in a coaxial fashion through the cannula into the lesion. Axial CT images were obtained with the needle tray deployed to confirm appropriate positioning. Then, multiple core needle biopsy specimens were obtained. Adequacy is adequacy of each specimen was confirmed visually after obtaining the sample.   Access site closed using bio sentry device.. Postprocedure imaging demonstrated no evidence of complication. The access site was covered with a sterile bandage. Patient was transferred to the recovery area in stable condition.       Technically successful right middle lobe core needle biopsy.   I was present for and/or performed the critical portions of the procedure and immediately available throughout the entire procedure.   I personally reviewed the image(s)/study and interpretation. I agree with the findings as stated.   Performed and dictated at [facility].   MACRO: None   Signed by: Gregory Moss 4/4/2025 4:04 PM Dictation workstation:   [id number]     Results for orders placed or performed during the hospital encounter  of 04/22/25 (from the past 24 hours)   Basic metabolic panel   Result Value Ref Range    Glucose 142 (H) 74 - 99 mg/dL    Sodium 137 136 - 145 mmol/L    Potassium 3.9 3.5 - 5.3 mmol/L    Chloride 102 98 - 107 mmol/L    Bicarbonate 30 21 - 32 mmol/L    Anion Gap 9 (L) 10 - 20 mmol/L    Urea Nitrogen 14 6 - 23 mg/dL    Creatinine 0.87 0.50 - 1.30 mg/dL    eGFR >90 >60 mL/min/1.73m*2    Calcium 9.2 8.6 - 10.3 mg/dL   Magnesium   Result Value Ref Range    Magnesium 1.80 1.60 - 2.40 mg/dL   Phosphorus   Result Value Ref Range    Phosphorus 2.9 2.5 - 4.9 mg/dL   Calcium, Ionized   Result Value Ref Range    POCT Calcium, Ionized 1.20 1.1 - 1.33 mmol/L   CBC   Result Value Ref Range    WBC 10.3 4.4 - 11.3 x10*3/uL    nRBC 0.0 0.0 - 0.0 /100 WBCs    RBC 4.70 4.50 - 5.90 x10*6/uL    Hemoglobin 14.0 13.5 - 17.5 g/dL    Hematocrit 44.5 41.0 - 52.0 %    MCV 95 80 - 100 fL    MCH 29.8 26.0 - 34.0 pg    MCHC 31.5 (L) 32.0 - 36.0 g/dL    RDW 12.9 11.5 - 14.5 %    Platelets 191 150 - 450 x10*3/uL         Assessment/Plan     Assessment & Plan:    Bebo Casillas is a 69 y.o. male presenting with with PMH adenocarcinoma, BPH, HLD, herpes zoster, colonic polyps, tobacco abuse for whom ICU was consulted status post right middle lobe lobectomy.    Neurological System:  #Postoperative pain control  -gabapentin, methocarbamol, PRN oxycodone    Cardiovascular System:  Last Echo: none on file  #Hx CAD  #Risk for post operative arrhythmia  #Hx HLD  -Beta blocker per thoracic  -continue home ASA, statin    Respiratory System:  #Lung adenocarcinoma s/p RML resection  #HX tobacco abuse  -PRN and scheduled nebulizers  -AM CXR  -follow chest tube output, management per thoracic   -smokes cigars 3x/week, counseled on smoking cessation    Gastrointestinal System:  -No acute issues  -PPI   -diet per surgery  -scheduled feliciano-colace    Endocrine System:  -no acute issues    Renal System:  #Hx BPH  -continue home doxazosin  -follow I/O    Hematological  System:  #postoperative anemia, expected  -trend CBC    Infection Disease System:  #Postoperative leukocytosis, expected  -completed post operative ancef    Vascular system & Extremities:  -chest tube in place  -DVT prophylaxis with lovenox     ICU CHECK LIST:   Antimicrobials: ancef  Oxygen: 2L NC  Drips: None  Feeding/Fluids: none  Analgesia: multimodal  Sedation: None  Thromboprophylaxis: SCDs and lovenox  Ulcer prophylaxis: PPI   Glycemic control: none    Bowel care: scheduled   Indwelling catheters: none  Lines: PIV, chest tube  Restraints: none   Dispo: ICU  Code Status: Full     Please wait for attending attestation for final plan.   This note was generated using voice recognition software, please excuse any grammatical errors.      Kike Santana DO             [1]   Family History  Problem Relation Name Age of Onset    Colon cancer Mother          Diagnosed in her 80s.    Brainstem hemorrhage (Multi) Father      Hypertension Father

## 2025-04-22 NOTE — ANESTHESIA POSTPROCEDURE EVALUATION
"Patient: Chandana Casillas \"Bill\"    Procedure Summary       Date: 04/22/25 Room / Location: [facility] 05 / [facility]    Anesthesia Start: 0733 Anesthesia Stop: 0951    Procedure: ROBOTIC ASSISTED RIGHT LUNG MIDDLE LOBE RESECTION & LYMPH NODE DISSECTION/ BRONCHOSCOPY (Right: Chest) Diagnosis:       Primary cancer of right middle lobe of lung (Multi)      (Primary cancer of right middle lobe of lung (Multi) [C34.2])    Surgeons: Junior Beckwith MD Responsible Provider: Niles Hernández MD    Anesthesia Type: general ASA Status: 3            Anesthesia Type: general    Vitals Value Taken Time   /110 04/22/25 09:48   Temp 36.1 °C (97 °F) 04/22/25 09:44   Pulse 88 04/22/25 09:50   Resp 20 04/22/25 09:44   SpO2 100 % 04/22/25 09:50   Vitals shown include unfiled device data.    Anesthesia Post Evaluation    Patient location during evaluation: PACU  Patient participation: complete - patient participated  Level of consciousness: awake  Pain score: 8  Pain management: adequate  Airway patency: patent  Cardiovascular status: acceptable, blood pressure returned to baseline and hemodynamically stable  Respiratory status: acceptable and face mask  Hydration status: acceptable  Postoperative Nausea and Vomiting: none        There were no known notable events for this encounter.    "

## 2025-04-22 NOTE — ANESTHESIA PROCEDURE NOTES
Arterial Line:    Date/Time: 4/22/2025 7:50 AM    Staffing  Performed: SRNA   Authorized by: Niles Hernández MD    Performed by: Katarzyna Shafer    An arterial line was placed. Procedure performed using surface landmarks.in the OR for the following indication(s): continuous blood pressure monitoring.    A 20 gauge (size), 5 cm (length), Arrow (type) catheter was placed into the Left radial artery, secured by tapeEvents:  patient tolerated procedure well with no complications.

## 2025-04-22 NOTE — PROGRESS NOTES
"Physical Therapy                 Therapy Communication Note    Patient Name: Chandana Casillas \"Bill\"  MRN: [id number]  Department: [facility] ICU  Room: 135/135-A  Today's Date: 4/22/2025     Discipline: Physical Therapy    Missed Visit Reason:  (Pt had procedure today, will follow up tomorrow for evaluation if medically stable and able to participate.)    Missed Time: Attempt    "

## 2025-04-22 NOTE — OP NOTE
"ROBOTIC ASSISTED RIGHT LUNG LOBE RESECTION & LYMPH NODE DISSECTION/ BRONCHOSCOPY/ POSSIBLE THORACOTOMY (R) Operative Note  Patient: Chandana Casillas  : 1955  MRN: [id number]    Preoperative Diagnosis: Primary cancer of right middle lobe of lung (Multi) [C34.2]  Postoperative Diagnosis: Primary cancer of right middle lobe of lung (Multi) [C34.2]    Procedure: Procedure(s) (LRB):  ROBOTIC ASSISTED RIGHT LUNG LOBE RESECTION & LYMPH NODE DISSECTION/ BRONCHOSCOPY/ POSSIBLE THORACOTOMY (Right)    Surgeon: Surgeon(s):  KENNEY Edwards MD    Other Staff:   Surgeons and Role:     * Bev Marroquin PA-C - JAYLYN First Assist   Circulator: Leticia Bowser RN  Scrub Person: Ekta Odom    Anesthesia Type: General    Indications: Chandana Casillas \"Bebo\" is an 69 y.o. male who presents for Primary cancer of right middle lobe of lung (Multi) [C34.2]. I recommended surgical resection    The patient was seen in the preoperative area. The risks, benefits, complications, treatment options, non-operative alternatives, expected recovery and outcomes were discussed with the patient. The possibilities of reaction to medication, pulmonary aspiration, injury to surrounding structures, bleeding, recurrent infection, the need for additional procedures, failure to diagnose a condition, and creating a complication requiring transfusion or operation were discussed with the patient. The patient concurred with the proposed plan, giving informed consent.  The site of surgery was properly noted/marked if necessary per policy.     Procedure Details:   The patient was placed on the operating table. A safety huddle was performed. General endotracheal anesthesia was initiated. Bronchoscopy was performed using the endotracheal tube which showed no evidence of endobronchial tumors or other lesions.  A double-lumen endotracheal tube was positioned and secured.    The patient was positioned in lateral decubitus position. " The patient was prepped with chlorhexidine & alcohol.    I started making 5 robotic/thoracoscopic port incisions in the right chest. I placed multi-level intercostal nerve blocks.  We used CO2 insufflation. We docked the robot and explored the chest.    I started by performed a mediastinal lymph node dissection.  I took lymph nodes from levels 2R, 4R, 7, 8R, 9R, and 10R. At the conclusion of this I felt hemostasis was appropriate.   I turned my attention to performing the lobectomy.  The fissures were very poorly developed with essentially no posterior fissure.  I started by dissecting in the anterior hilum the superior pulmonary vein and I dissected and isolated the right middle lobe portion of the superior vein.  With this performed I was able to have a better appreciation of the anterior fissure.  I dissected and divided the anterior fissure to expose the pulmonary artery.  I dissected the pulmonary artery identifying the right middle lobe branch.  I extended the fissure posteriorly to assist in this dissection.  During this dissection I encountered a level 11 lymph node which was taken and sent for pathologic evaluation.  I encircled and divided this pulmonary artery branch of the right middle lobe.  After this, I had improved exposure to the right middle lobe branch of the vein.  This was also dissected and divided.  I turned my attention to the bronchus, which was also dissected and divided using robotic stapling.  After this dissection, I explored the pulmonary artery and noted a second significant PA branch of the right middle lobe.  This was dissected and divided as well.  Lastly, I turned my attention to dividing parenchyma.  The parenchyma was divided using serial firings of robotic staplers until the specimen was freed.  The specimen was placed in Endo Catch bag and handed off.    The chest was evaluated for hemostasis and felt to be appropriate. I placed a 28F chest tube and re-expanded the lung. The  "lung expanded well. The instruments were removed. The wounds were closed in layers.  The skin was dressed with Dermabond.  The procedure was completed and patient was brought to Recovery without evidence of immediate complications.         The physicians assistant in this case was involved because no suitable resident was available to perform the \"bedside assist\" role. The physicians assistant in this case was involved in the actual performance of the covered surgical procedure and not simply present to perform ancillary services     Estimated blood loss:  <10  Complications: None  Disposition: Recovery  Condition: stable    Attending Attestation: I was present and scrubbed for the entire procedure.    Specimens:   ID Type Source Tests Collected by Time   1 : LEVEL 9 LYMPH NODE Tissue LYMPH NODE PULMONARY (SPECIFY SITE) SURGICAL PATHOLOGY EXAM Junior Beckwith MD 4/22/2025 0819   2 : LEVEL 8 LYMPH NODE Tissue LYMPH NODE PULMONARY (SPECIFY SITE) SURGICAL PATHOLOGY EXAM Junior Beckwith MD 4/22/2025 0819   3 : LEVEL 7 LYMPH NODE Tissue LYMPH NODE PULMONARY (SPECIFY SITE) SURGICAL PATHOLOGY EXAM Junior Beckwith MD 4/22/2025 0822   4 : LEVEL 10 LYMPH NODE Tissue LYMPH NODE PULMONARY (SPECIFY SITE) SURGICAL PATHOLOGY EXAM Junior Beckwith MD 4/22/2025 0822   5 : LEVEL 4 LYMPH NODE Tissue LYMPH NODE PULMONARY (SPECIFY SITE) SURGICAL PATHOLOGY EXAM Junior Beckwith MD 4/22/2025 0824   6 : LEVEL 2 LYMPH NODE Tissue LYMPH NODE PULMONARY (SPECIFY SITE) SURGICAL PATHOLOGY EXAM Junior Beckwith MD 4/22/2025 0824   7 : RIGHT Tissue LUNG RESECTION RIGHT (SPECIFY SITE) SURGICAL PATHOLOGY EXAM Junior Beckwith MD 4/22/2025 0837   8 : LEVEL 11 LYMPH NODE Tissue LYMPH NODE PULMONARY (SPECIFY SITE) SURGICAL PATHOLOGY EXAM Junior Beckwith MD 4/22/2025 0842   9 : LEVEL 11 LYMPH NODE, R I Tissue LYMPH NODE PULMONARY (SPECIFY SITE) SURGICAL PATHOLOGY EXAM Junior Beckwith MD 4/22/2025 0855       Junior FUNG" Amena  Phone Number: [phone]

## 2025-04-22 NOTE — ANESTHESIA PREPROCEDURE EVALUATION
"Patient: Chandana Casillas \"Bill\"    Procedure Information       Date/Time: 04/22/25 0730    Procedure: ROBOTIC ASSISTED RIGHT LUNG LOBE RESECTION & LYMPH NODE DISSECTION/ BRONCHOSCOPY/ POSSIBLE THORACOTOMY (Right: Chest) - bronchoscopy, Robot-Assisted Lung Lobe Resection and Lymphnode dissection, possible thoracotomy    Location: [facility] 05 / [facility]    Surgeons: Junior Beckwith MD            Relevant Problems   Anesthesia (within normal limits)      Cardiac   (+) CAD (coronary artery disease)      Pulmonary   (+) Cancer of middle lobe of lung (Multi)   (+) Primary cancer of right middle lobe of lung (Multi)      Neuro   (+) Occlusion and stenosis of unspecified carotid artery      GI   (+) Gastroesophageal reflux disease      /Renal   (+) BPH (benign prostatic hyperplasia)      ID   (+) Acute upper respiratory infection   (+) Shingles      Skin   (+) Eczema   (+) Localized rash       Clinical information reviewed:   Tobacco  Allergies  Meds   Med Hx  Surg Hx   Fam Hx          NPO Detail:  NPO/Void Status  Carbohydrate Drink Given Prior to Surgery? : N  Date of Last Liquid: 04/21/25  Time of Last Liquid: 2100  Date of Last Solid: 04/21/25  Time of Last Solid: 2000  Last Intake Type: Clear fluids  Time of Last Void: 0657         Physical Exam    Airway  Mallampati: II  TM distance: >3 FB  Neck ROM: full  Mouth opening: 3 or more finger widths     Cardiovascular - normal exam  Rhythm: regular  Rate: normal     Dental - normal exam     Pulmonary - normal examBreath sounds clear to auscultation     Abdominal            Anesthesia Plan    History of general anesthesia?: no  History of complications of general anesthesia?: no    ASA 3     general     The patient is not a current smoker.    intravenous induction   Postoperative pain plan includes opioids.  Trial extubation is planned.  Anesthetic plan and risks discussed with patient.  Use of blood products discussed with patient who.    Plan discussed with " CRNA.

## 2025-04-22 NOTE — ANESTHESIA PROCEDURE NOTES
Airway  Date/Time: 4/22/2025 7:44 AM  Reason: elective    Airway not difficult    Staffing  Performed: JACQUELIN   Authorized by: Niles Hernández MD    Performed by: Katarzyna Shafer  Patient location during procedure: OR    Patient Condition  Indications for airway management: anesthesia  Patient position: sniffing  MILS maintained throughout  Planned trial extubation  Sedation level: deep     Final Airway Details   Preoxygenated: yes  Final airway type: endotracheal airway  Successful airway: ETT - double lumen left  Cuffed: yes   Successful intubation technique: video laryngoscopy ([place])  Adjuncts used in placement: intubating stylet  Endotracheal tube insertion site: oral  Blade: Jt  Blade size: #4  ETT DL size (fr): 37  Cormack-Lehane Classification: grade I - full view of glottis  Placement verified by: chest auscultation, bronchoscopy, capnometry and single lung ventilation   Measured from: lips  ETT to lips (cm): 30  Ventilation between attempts: none  Number of attempts at approach: 1

## 2025-04-22 NOTE — H&P
"    [facility]   Thoracic Surgery   History of Presenting Illness       History Of Present Illness  Chandana Casillas \"Mitesh" is a 69 y.o. male with a PMH significant for HLD, BPH, and previous nicotine use dosirder who presents to [facility] on 4/22/2025 for a staged right middle lobe wedge vs lobectomy with thoracic surgeon, Dr. Junior Beckwith.     This patient has been receiving CT screening for lung cancer. A CT scan performed on 1/17/2024 showed a groundglass opacity in the right middle lobe measuring 14 mm in size.  He received a follow-up scan on 2/20/2025 which showed a solid component measuring 7 mm with in this groundglass lesion.  I recommended a PET/CT which was concerning for early primary lung cancer.  I recommended a CT-guided biopsy.  This was performed on 4/1/2025 and consistent with adenocarcinoma which was TTF-1 positive.      Subjective    Past Medical History  He has a past medical history of Benign prostatic hyperplasia (02/21/2025), Elevated low density lipoprotein (LDL) cholesterol level (02/21/2025), Herpes zoster (02/21/2025), Lung cancer (Multi), Occlusion of carotid artery (02/21/2025), Overweight with body mass index (BMI) 25.0-29.9 (02/21/2025), Personal history of colonic polyps (01/05/2017), Personal history of other diseases of the circulatory system, Personal history of other endocrine, nutritional and metabolic disease, and Vision loss.    Surgical History  He has a past surgical history that includes Cardiac catheterization and Colonoscopy.     Social History  He reports that he has quit smoking. His smoking use included cigars. He has never used smokeless tobacco. He reports current alcohol use of about 10.0 standard drinks of alcohol per week. He reports that he does not use drugs.    Family History  Family History[1]     Allergies  Patient has no known allergies.    Review of Systems   Unable to perform ROS: Acuity of " condition   Constitutional: Positive for malaise/fatigue. Negative for chills and diaphoresis.   Eyes:  Negative for blurred vision and double vision.   Cardiovascular:  Negative for chest pain, dyspnea on exertion, irregular heartbeat and leg swelling.   Respiratory:  Negative for cough and shortness of breath.             Objective    Physical Exam  Vitals and nursing note reviewed.   Constitutional:       General: He is not in acute distress.     Appearance: Normal appearance. He is not ill-appearing.      Interventions: Nasal cannula in place.   HENT:      Head: Normocephalic and atraumatic.      Nose: Nose normal.      Mouth/Throat:      Mouth: Mucous membranes are moist.      Pharynx: Oropharynx is clear.   Eyes:      Extraocular Movements: Extraocular movements intact.      Conjunctiva/sclera: Conjunctivae normal.      Pupils: Pupils are equal, round, and reactive to light.   Cardiovascular:      Rate and Rhythm: Normal rate and regular rhythm.      Pulses: Normal pulses.      Heart sounds: Normal heart sounds, S1 normal and S2 normal. No murmur heard.     No systolic murmur is present.      No friction rub. No gallop.   Pulmonary:      Effort: Pulmonary effort is normal.      Breath sounds: Normal breath sounds.   Chest:      Comments: Right Pleural Chest Tube   Abdominal:      General: Abdomen is flat. Bowel sounds are normal. There is no distension.      Palpations: Abdomen is soft.   Musculoskeletal:         General: Normal range of motion.      Cervical back: Normal range of motion and neck supple.      Right lower leg: No edema.      Left lower leg: No edema.   Skin:     General: Skin is warm and dry.      Capillary Refill: Capillary refill takes less than 2 seconds.      Findings: No lesion.      Nails: There is no clubbing.      Comments: Right Chest Wall Incisions C/D/I, well approximated   Neurological:      General: No focal deficit present.      Mental Status: He is alert, oriented to person,  place, and time and easily aroused. Mental status is at baseline.      GCS: GCS eye subscore is 3. GCS verbal subscore is 5. GCS motor subscore is 6.      Cranial Nerves: Cranial nerves 2-12 are intact.      Sensory: Sensation is intact.      Motor: Motor function is intact.   Psychiatric:         Attention and Perception: Attention and perception normal.         Mood and Affect: Mood normal.         Speech: Speech normal.         Behavior: Behavior normal.         Thought Content: Thought content normal.         Cognition and Memory: Cognition and memory normal.         Judgment: Judgment normal.         Vitals  Vitals:    04/22/25 1400   BP:    Pulse: 87   Resp: 10   Temp:    SpO2: 96%       Home Medications  Current Outpatient Medications   Medication Instructions    ascorbic acid (VITAMIN C) 500 mg, Daily    aspirin 81 mg EC tablet     cyanocobalamin (VITAMIN B-12) 1,000 mcg, Daily    doxazosin (CARDURA) 4 mg, oral, Daily    hydrocortisone 2.5 % cream Topical, 2 times daily    melatonin 5 mg capsule 1 capsule, Nightly    multivitamin with minerals iron-free (Centrum Silver) 1 tablet, Daily    simvastatin (ZOCOR) 80 mg, oral, Daily    vitamin D3-folic acid 250 mcg (10,000 unit)-1 mg tablet 1 tablet, Daily        Inpatient Medications  Scheduled medications  Scheduled Medications[2]  Continuous medications  Continuous Medications[3]  PRN medications  PRN Medications[4]     LDA:  Arterial Line 04/22/25 Left Radial (Active)   Placement Date/Time: 04/22/25 (c) 0750   Size: 20 G  Orientation: Left  Location: Radial  Securement Method: Taped  Patient Tolerance: Tolerated well   Number of days: 0       Chest Tube 1 Right Other (Comment) 28 Fr (Active)   Placement Date: 04/22/25   Placed by: DR. QUIJANO  Hand Hygiene Completed: Yes  Tube Number: 1  Chest Tube Orientation: Right  Chest Tube Location: (c) Other (Comment)  Chest Tube Drain Tube Size (Fr): 28 Fr  Number of Sutures Placed: 1   Number of days: 0            Relevant Results  Vitals  Vitals:    04/22/25 1400   BP:    Pulse: 87   Resp: 10   Temp:    SpO2: 96%       Lab Review  Results from last 7 days   Lab Units 04/22/25  1134   WBC AUTO x10*3/uL 10.3   HEMOGLOBIN g/dL 14.0   HEMATOCRIT % 44.5   PLATELETS AUTO x10*3/uL 191     Results from last 7 days   Lab Units 04/22/25  1133   SODIUM mmol/L 137   POTASSIUM mmol/L 3.9   CHLORIDE mmol/L 102   CO2 mmol/L 30   BUN mg/dL 14   CREATININE mg/dL 0.87   CALCIUM mg/dL 9.2   GLUCOSE mg/dL 142*     Results from last 7 days   Lab Units 04/22/25  1133   MAGNESIUM mg/dL 1.80           I/O:  No intake/output data recorded.        Radiographic Testing  EKG:  ECG 12 lead 04/16/2025      Chest Imaging:  XR chest 1 view   Final Result   1. Postoperative changes, as above.   2. No focal infiltrate or pneumothorax is identified.        MACRO:   None.        Signed by: Musa Knutson 4/22/2025 11:59 AM   Dictation workstation:   [id number]                 Daily Progress Note    4/22/2025: Seen and assessed patient in the immediate post-op period; s/p RML lobectomy. Plan for inpatient recovery.        Assessment & Plan       Right Lung Nodule  - S/p Right Robotic Middle Lobe Lobectomy on 4/22/25 with Thoracic Surgeon, Dr. Junior Beckwith   - Maintain chest tube to waterseal for now  --> Okay to remove after 3 hours contingent on CXR & output  - Metoprolol tartrate for 30 days  --> AF prophylaxis following lobectomy   - Follow CXR  - Follow up with surgical pathology in the OP setting     Biopsy     4/1/25: Adenocarcinoma (TTF-1 positive)        Acute Respiratory Insufficiency   - As anticipated in the immediate post operative period   - Current O2 Requirements: 2L NC   - IS/Deep Breathing Exercises  - ICU following   --> Appreciate input & recommendations        Risk for Post-Operative Arrythmia   - Given thoracic surgery, patient is at risk for development of atrial fibrilllation   - Plan to schedule on metoprolol tartrate 12.5mg BID in  the immediate post-op period and continue for 30 days  - Observe & replace electrolytes       Acute Post-Op Pain  - As expected   - Multimodal pain control   --> Scheduled: Acetaminophen, magnesium oxide 400mg QD, gabapentin 100mg TID, methocarbamol 500mg q6h   --> PRN: oxycodone & fentanyl  - Bowel regimen while taking narcotics        Risk for Post-op Hyperglycemia  - Home Medication: None       Hyperlipidemia  - Home Medication: Simvastatin 40mg  - Continued        BPH  - Home Medication: Doxazosin 4mg every day  - Continued     Risk for Electrolyte Disturbances  - Optimize electrolytes per [facility] protocol      Bowel Regimen  - Senna-S BID   - PRN suppositories and miralax    Prophylaxis  - GI: PPI  - DVT: Ok-Hose & starting enoxaparin on POD #1  - PT/OT eval-once clinically stable     Disposition  - CVICU    Above patient and plan discussed with thoracic surgeon, Dr. Lul Beckwith.     Michael Hernandez, APRN-CNP, DNP          [1]   Family History  Problem Relation Name Age of Onset    Colon cancer Mother          Diagnosed in her 80s.    Brainstem hemorrhage (Multi) Father      Hypertension Father     [2] ascorbic acid, 500 mg, oral, Daily  aspirin, 81 mg, oral, Daily  cyanocobalamin, 1,000 mcg, oral, Daily  doxazosin, 4 mg, oral, Daily  [START ON 4/23/2025] enoxaparin, 40 mg, subcutaneous, Daily  gabapentin, 100 mg, oral, TID  ipratropium-albuteroL, 3 mL, nebulization, TID  magnesium oxide, 400 mg, oral, Daily  melatonin, 5 mg, oral, Nightly  methocarbamol, 500 mg, oral, q8h ROBERT  multivitamin with minerals, 1 tablet, oral, Daily  oxygen, , inhalation, Continuous - Inhalation  sennosides-docusate sodium, 2 tablet, oral, BID  simvastatin, 40 mg, oral, Nightly     [3] lactated Ringer's, 100 mL/hr, Last Rate: 100 mL/hr (04/22/25 1019)     [4] PRN medications: albumin human, calcium chloride, calcium chloride, electrolyte-A, fentaNYL, magnesium sulfate, magnesium sulfate, metoclopramide **OR**  metoclopramide, naloxone, ondansetron ODT **OR** ondansetron, oxyCODONE, oxyCODONE, polyethylene glycol, potassium chloride CR **OR** potassium chloride, potassium chloride CR **OR** potassium chloride, potassium chloride, potassium chloride

## 2025-04-23 ENCOUNTER — PHARMACY VISIT (OUTPATIENT)
Dept: PHARMACY | Facility: CLINIC | Age: 70
End: 2025-04-23
Payer: COMMERCIAL

## 2025-04-23 ENCOUNTER — APPOINTMENT (OUTPATIENT)
Dept: RADIOLOGY | Facility: HOSPITAL | Age: 70
End: 2025-04-23
Payer: MEDICARE

## 2025-04-23 VITALS
RESPIRATION RATE: 22 BRPM | HEART RATE: 66 BPM | OXYGEN SATURATION: 93 % | WEIGHT: 185.19 LBS | HEIGHT: 71 IN | SYSTOLIC BLOOD PRESSURE: 152 MMHG | BODY MASS INDEX: 25.93 KG/M2 | TEMPERATURE: 97.9 F | DIASTOLIC BLOOD PRESSURE: 69 MMHG

## 2025-04-23 LAB
ANION GAP SERPL CALC-SCNC: 10 MMOL/L (ref 10–20)
BUN SERPL-MCNC: 10 MG/DL (ref 6–23)
CALCIUM SERPL-MCNC: 8.7 MG/DL (ref 8.6–10.3)
CHLORIDE SERPL-SCNC: 100 MMOL/L (ref 98–107)
CO2 SERPL-SCNC: 32 MMOL/L (ref 21–32)
CREAT SERPL-MCNC: 0.75 MG/DL (ref 0.5–1.3)
EGFRCR SERPLBLD CKD-EPI 2021: >90 ML/MIN/1.73M*2
ERYTHROCYTE [DISTWIDTH] IN BLOOD BY AUTOMATED COUNT: 13 % (ref 11.5–14.5)
GLUCOSE SERPL-MCNC: 145 MG/DL (ref 74–99)
HCT VFR BLD AUTO: 40.8 % (ref 41–52)
HGB BLD-MCNC: 12.7 G/DL (ref 13.5–17.5)
MAGNESIUM SERPL-MCNC: 1.93 MG/DL (ref 1.6–2.4)
MCH RBC QN AUTO: 29.3 PG (ref 26–34)
MCHC RBC AUTO-ENTMCNC: 31.1 G/DL (ref 32–36)
MCV RBC AUTO: 94 FL (ref 80–100)
NRBC BLD-RTO: 0 /100 WBCS (ref 0–0)
PLATELET # BLD AUTO: 188 X10*3/UL (ref 150–450)
POTASSIUM SERPL-SCNC: 4 MMOL/L (ref 3.5–5.3)
RBC # BLD AUTO: 4.33 X10*6/UL (ref 4.5–5.9)
SODIUM SERPL-SCNC: 138 MMOL/L (ref 136–145)
WBC # BLD AUTO: 7.5 X10*3/UL (ref 4.4–11.3)

## 2025-04-23 PROCEDURE — RXMED WILLOW AMBULATORY MEDICATION CHARGE

## 2025-04-23 PROCEDURE — 71045 X-RAY EXAM CHEST 1 VIEW: CPT | Performed by: RADIOLOGY

## 2025-04-23 PROCEDURE — 2500000001 HC RX 250 WO HCPCS SELF ADMINISTERED DRUGS (ALT 637 FOR MEDICARE OP): Performed by: NURSE PRACTITIONER

## 2025-04-23 PROCEDURE — 85027 COMPLETE CBC AUTOMATED: CPT | Performed by: NURSE PRACTITIONER

## 2025-04-23 PROCEDURE — 37799 UNLISTED PX VASCULAR SURGERY: CPT | Performed by: NURSE PRACTITIONER

## 2025-04-23 PROCEDURE — 80048 BASIC METABOLIC PNL TOTAL CA: CPT | Performed by: NURSE PRACTITIONER

## 2025-04-23 PROCEDURE — 99238 HOSP IP/OBS DSCHRG MGMT 30/<: CPT

## 2025-04-23 PROCEDURE — 2500000004 HC RX 250 GENERAL PHARMACY W/ HCPCS (ALT 636 FOR OP/ED): Performed by: NURSE PRACTITIONER

## 2025-04-23 PROCEDURE — 99232 SBSQ HOSP IP/OBS MODERATE 35: CPT

## 2025-04-23 PROCEDURE — 71045 X-RAY EXAM CHEST 1 VIEW: CPT

## 2025-04-23 PROCEDURE — 82374 ASSAY BLOOD CARBON DIOXIDE: CPT | Performed by: NURSE PRACTITIONER

## 2025-04-23 PROCEDURE — 94640 AIRWAY INHALATION TREATMENT: CPT

## 2025-04-23 PROCEDURE — 83735 ASSAY OF MAGNESIUM: CPT | Performed by: NURSE PRACTITIONER

## 2025-04-23 PROCEDURE — 2500000002 HC RX 250 W HCPCS SELF ADMINISTERED DRUGS (ALT 637 FOR MEDICARE OP, ALT 636 FOR OP/ED): Mod: JZ | Performed by: THORACIC SURGERY (CARDIOTHORACIC VASCULAR SURGERY)

## 2025-04-23 RX ORDER — METHOCARBAMOL 500 MG/1
500 TABLET, FILM COATED ORAL EVERY 8 HOURS PRN
Qty: 21 TABLET | Refills: 0 | Status: SHIPPED | OUTPATIENT
Start: 2025-04-23 | End: 2025-04-30

## 2025-04-23 RX ORDER — OXYCODONE HYDROCHLORIDE 5 MG/1
5 TABLET ORAL EVERY 6 HOURS PRN
Qty: 15 TABLET | Refills: 0 | Status: SHIPPED | OUTPATIENT
Start: 2025-04-23 | End: 2025-04-30

## 2025-04-23 RX ORDER — AMOXICILLIN 250 MG
2 CAPSULE ORAL 2 TIMES DAILY
Qty: 120 TABLET | Refills: 0 | Status: SHIPPED | OUTPATIENT
Start: 2025-04-23 | End: 2025-05-23

## 2025-04-23 RX ORDER — METOPROLOL TARTRATE 25 MG/1
12.5 TABLET, FILM COATED ORAL 2 TIMES DAILY
Qty: 29 TABLET | Refills: 0 | Status: SHIPPED | OUTPATIENT
Start: 2025-04-23 | End: 2025-05-22

## 2025-04-23 RX ADMIN — Medication 500 MG: at 08:28

## 2025-04-23 RX ADMIN — SENNOSIDES AND DOCUSATE SODIUM 2 TABLET: 50; 8.6 TABLET ORAL at 08:28

## 2025-04-23 RX ADMIN — MAGNESIUM OXIDE TAB 400 MG (241.3 MG ELEMENTAL MG) 400 MG: 400 (241.3 MG) TAB at 08:28

## 2025-04-23 RX ADMIN — DOXAZOSIN 4 MG: 4 TABLET ORAL at 09:55

## 2025-04-23 RX ADMIN — IPRATROPIUM BROMIDE AND ALBUTEROL SULFATE 3 ML: .5; 3 SOLUTION RESPIRATORY (INHALATION) at 12:01

## 2025-04-23 RX ADMIN — IPRATROPIUM BROMIDE AND ALBUTEROL SULFATE 3 ML: .5; 3 SOLUTION RESPIRATORY (INHALATION) at 07:23

## 2025-04-23 RX ADMIN — METOPROLOL TARTRATE 12.5 MG: 25 TABLET, FILM COATED ORAL at 08:28

## 2025-04-23 RX ADMIN — OXYCODONE HYDROCHLORIDE 5 MG: 5 TABLET ORAL at 06:53

## 2025-04-23 RX ADMIN — ASPIRIN 81 MG: 81 TABLET, COATED ORAL at 08:28

## 2025-04-23 RX ADMIN — OXYCODONE HYDROCHLORIDE 5 MG: 5 TABLET ORAL at 00:33

## 2025-04-23 RX ADMIN — METHOCARBAMOL 500 MG: 500 TABLET ORAL at 06:50

## 2025-04-23 RX ADMIN — Medication 1 TABLET: at 08:28

## 2025-04-23 RX ADMIN — ENOXAPARIN SODIUM 40 MG: 40 INJECTION SUBCUTANEOUS at 08:27

## 2025-04-23 RX ADMIN — GABAPENTIN 100 MG: 100 CAPSULE ORAL at 08:28

## 2025-04-23 RX ADMIN — CYANOCOBALAMIN TAB 1000 MCG 1000 MCG: 1000 TAB at 08:28

## 2025-04-23 ASSESSMENT — PAIN SCALES - GENERAL
PAINLEVEL_OUTOF10: 6
PAINLEVEL_OUTOF10: 0 - NO PAIN
PAINLEVEL_OUTOF10: 5 - MODERATE PAIN
PAINLEVEL_OUTOF10: 3

## 2025-04-23 ASSESSMENT — PAIN - FUNCTIONAL ASSESSMENT
PAIN_FUNCTIONAL_ASSESSMENT: 0-10

## 2025-04-23 ASSESSMENT — PAIN DESCRIPTION - LOCATION: LOCATION: RIB CAGE

## 2025-04-23 NOTE — PROGRESS NOTES
"Physical Therapy                 Therapy Communication Note    Patient Name: Chandana Casillas \"Bill\"  MRN: [id number]  Department:   Room: [address] [place], [place] [address]A  Today's Date: 4/23/2025     Discipline: Physical Therapy          Missed Visit Reason: Missed Visit Reason:  (Pt in bed, disconnected from tele, in regular clothes. Pt reports he is independent and has been ambulating in room himself. Pt denies any therapy needs. Will DC OT orders at this time.)    Missed Time: Attempt      "

## 2025-04-23 NOTE — PROGRESS NOTES
"Occupational Therapy                 Therapy Communication Note    Patient Name: Chandana Casillas \"Bill\"  MRN: [id number]  Department: [facility]  Room: 135/[facility]  Today's Date: 4/23/2025     Discipline: Occupational Therapy    OT Missed Visit: Yes     Missed Visit Reason: Missed Visit Reason:  (pt. approached for eval, pt. in hospital bed/fully dressed, pt. states is being discharged, has been independent with adl/ambulation in hospital setting, pt. has no concern re:  returning home at independent level, discharge ot, no skilled needs)    Missed Time: Attempt    Comment:  "

## 2025-04-23 NOTE — PROGRESS NOTES
04/23/25 1227   Discharge Planning   Living Arrangements Spouse/significant other;Parent  (patient's MIL)   Support Systems Spouse/significant other;Parent;Friends/neighbors   Assistance Needed iadls, driving PTA   Type of Residence Private residence   Number of Stairs to Enter Residence 3   Number of Stairs Within Residence 0   Do you have animals or pets at home? Yes   Type of Animals or Pets 1 dog   Home or Post Acute Services None   Expected Discharge Disposition Home   Does the patient need discharge transport arranged? No  (spouse)     Record reviewed.  POD #1 RA Rt lung lobe resection and LND/Bronchoscopy.  This TCC met with patient and spouse at bedside, introduced self and explained role.  Demographic information and insurance verified.  Patient is from home with spouse, independent and driving PTA.  Denies SW needs at this time.  Patient plans to return home at discharge, no needs.  Patient's spouse is providing transportation home.  Care Transitions will continue to follow.

## 2025-04-23 NOTE — CARE PLAN
Problem: Pain - Adult  Goal: Verbalizes/displays adequate comfort level or baseline comfort level  Outcome: Progressing     Problem: Safety - Adult  Goal: Free from fall injury  Outcome: Progressing     Problem: Discharge Planning  Goal: Discharge to home or other facility with appropriate resources  Outcome: Progressing     Problem: Chronic Conditions and Co-morbidities  Goal: Patient's chronic conditions and co-morbidity symptoms are monitored and maintained or improved  Outcome: Progressing     Problem: Nutrition  Goal: Nutrient intake appropriate for maintaining nutritional needs  Outcome: Progressing     Problem: Respiratory  Goal: Clear secretions with interventions this shift  Outcome: Progressing  Goal: Minimize anxiety/maximize coping throughout shift  Outcome: Progressing  Goal: Minimal/no exertional discomfort or dyspnea this shift  Outcome: Progressing  Goal: No signs of respiratory distress (eg. Use of accessory muscles. Peds grunting)  Outcome: Progressing  Goal: Patent airway maintained this shift  Outcome: Progressing  Goal: Verbalize decreased shortness of breath this shift  Outcome: Progressing  Goal: Wean oxygen to maintain O2 saturation per order/standard this shift  Outcome: Progressing  Goal: Increase self care and/or family involvement in next 24 hours  Outcome: Progressing     Problem: Skin  Goal: Decreased wound size/increased tissue granulation at next dressing change  Outcome: Progressing  Goal: Participates in plan/prevention/treatment measures  Outcome: Progressing  Goal: Prevent/manage excess moisture  Outcome: Progressing  Goal: Prevent/minimize sheer/friction injuries  Outcome: Progressing  Goal: Promote/optimize nutrition  Outcome: Progressing  Goal: Promote skin healing  Outcome: Progressing   The patient's goals for the shift include      The clinical goals for the shift include patient to remain hemodynamically stable throughout shift

## 2025-04-23 NOTE — DISCHARGE SUMMARY
Discharge Diagnosis  Primary cancer of right middle lobe of lung (Multi)      Test Results Pending At Discharge  Pending Labs       Order Current Status    Surgical Pathology Exam In process            Hospital Course   You were admitted to [facility] post right middle lobectomy with Dr. Beckwith on 4/22/2025 for right lung adenocarcinoma.     Pertinent Physical Exam At Time of Discharge  Physical Exam  Vitals and nursing note reviewed.   Constitutional:       General: He is awake.      Appearance: Normal appearance.   HENT:      Head: Normocephalic and atraumatic.      Mouth/Throat:      Lips: Pink.      Mouth: Mucous membranes are moist.   Eyes:      General: Lids are normal.      Extraocular Movements: Extraocular movements intact.      Pupils: Pupils are equal, round, and reactive to light.   Cardiovascular:      Rate and Rhythm: Normal rate and regular rhythm.      Pulses: Normal pulses.      Heart sounds: No murmur heard.     No friction rub. No gallop.   Pulmonary:      Effort: Pulmonary effort is normal.   Abdominal:      General: Bowel sounds are normal.      Palpations: Abdomen is soft.      Tenderness: There is no abdominal tenderness.   Musculoskeletal:      Cervical back: Normal range of motion and neck supple.   Skin:     General: Skin is warm.      Capillary Refill: Capillary refill takes less than 2 seconds.   Neurological:      Mental Status: He is alert. Mental status is at baseline.      Sensory: Sensation is intact.      Motor: Motor function is intact.   Psychiatric:         Attention and Perception: Attention normal.         Speech: Speech normal.         Cognition and Memory: Cognition normal.         Home Medications     Medication List      START taking these medications     methocarbamol 500 mg tablet; Commonly known as: Robaxin; Take 1 tablet   (500 mg) by mouth every 8 hours if needed for muscle spasms for up to 7   days.   metoprolol tartrate 25 mg tablet; Commonly known as:  Lopressor; Take 0.5   tablets (12.5 mg) by mouth 2 times a day for 58 doses.   oxyCODONE 5 mg immediate release tablet; Commonly known as: Roxicodone;   Take 1 tablet (5 mg) by mouth every 6 hours if needed for severe pain (7 -   10) for up to 7 days.   sennosides-docusate sodium 8.6-50 mg tablet; Commonly known as:   Rosemary-Colace; Take 2 tablets by mouth 2 times a day.     CONTINUE taking these medications     ascorbic acid 500 mg tablet; Commonly known as: Vitamin C   aspirin 81 mg EC tablet   cyanocobalamin 1,000 mcg tablet; Commonly known as: Vitamin B-12   doxazosin 4 mg tablet; Commonly known as: Cardura; TAKE 1 TABLET BY   MOUTH ONCE DAILY   hydrocortisone 2.5 % cream; Apply topically 2 times a day.   melatonin 5 mg capsule   multivitamin with minerals iron-free; Commonly known as: Centrum Silver   simvastatin 80 mg tablet; Commonly known as: Zocor; TAKE 1 TABLET BY   MOUTH ONCE DAILY   vitamin D3-folic acid 250 mcg (10,000 unit)-1 mg tablet     STOP taking these medications     chlorhexidine 0.12 % solution; Commonly known as: Peridex   chlorhexidine 4 % external liquid; Commonly known as: Hibiclens       Outpatient Follow-Up  Future Appointments   Date Time Provider Department Center   5/7/2025  2:45 PM Junior Beckwith MD [id number] [place]   2/5/2026 10:00 AM Cali Menendez MD [id number] [place]       Barbara Thurston, APRN-CNP

## 2025-04-23 NOTE — PROGRESS NOTES
"Medical Intensive Care - Daily Progress Note   Subjective    Chandana Casillas is a 69 y.o. year old male patient admitted on 4/22/2025 with following ICU needs: medical management s/p right middle lobectomy.    Interval History:  Patient is resting comfortably.  He did not have any acute overnight events.  He does not complain of any chest pain or shortness of breath.    Meds    Scheduled medications  Scheduled Medications[1]  Continuous medications  Continuous Medications[2]  PRN medications  PRN Medications[3]     Objective    Blood pressure 152/69, pulse 69, temperature 36.2 °C (97.2 °F), temperature source Temporal, resp. rate 18, height 1.803 m (5' 11\"), weight 84 kg (185 lb 3 oz), SpO2 93%.     Physical Exam  HENT:      Head: Normocephalic.   Eyes:      Conjunctiva/sclera: Conjunctivae normal.   Cardiovascular:      Rate and Rhythm: Regular rhythm.      Heart sounds: Normal heart sounds.   Pulmonary:      Breath sounds: Normal breath sounds.   Abdominal:      Palpations: Abdomen is soft.   Musculoskeletal:         General: Normal range of motion.      Cervical back: Normal range of motion.   Skin:     General: Skin is warm.   Neurological:      General: No focal deficit present.      Mental Status: He is alert.   Psychiatric:         Mood and Affect: Mood normal.            Intake/Output Summary (Last 24 hours) at 4/23/2025 0713  Last data filed at 4/22/2025 1750  Gross per 24 hour   Intake 1325 ml   Output 15 ml   Net 1310 ml     Labs:   Results from last 72 hours   Lab Units 04/23/25  0627 04/22/25  1133   SODIUM mmol/L 138 137   POTASSIUM mmol/L 4.0 3.9   CHLORIDE mmol/L 100 102   CO2 mmol/L 32 30   BUN mg/dL 10 14   CREATININE mg/dL 0.75 0.87   GLUCOSE mg/dL 145* 142*   CALCIUM mg/dL 8.7 9.2   ANION GAP mmol/L 10 9*   EGFR mL/min/1.73m*2 >90 >90   PHOSPHORUS mg/dL  --  2.9      Results from last 72 hours   Lab Units 04/23/25  0627 04/22/25  1134   WBC AUTO x10*3/uL 7.5 10.3   HEMOGLOBIN g/dL 12.7* 14.0 " "  HEMATOCRIT % 40.8* 44.5   PLATELETS AUTO x10*3/uL 188 191                 Micro/ID:     No results found for: \"URINECULTURE\", \"BLOODCULT\", \"CSFCULTSMEAR\"    Assessment and Plan     Assessment: Chandana Casillas is a 69 y.o. year old male presenting with with PMH adenocarcinoma, BPH, HLD, herpes zoster, colonic polyps, tobacco abuse for whom ICU was consulted status post right middle lobe lobectomy.      Mechanical Ventilation: none  Sedation/Analgesia:  none  Restraints: no    Daily progress:  S/p chest tube removal.  AM CXR shows improving right lower lung atelectasis.     Neurological System:  #Postoperative pain control  -gabapentin, methocarbamol, PRN fentanyl, oxycodone     Cardiovascular System:  Last Echo: none on file  #Hx CAD  #Risk for post operative arrhythmia  #Hx HLD  -Beta blocker per thoracic  -continue home ASA, statin     Respiratory System:  #Lung adenocarcinoma s/p RML resection  #HX tobacco abuse  -PRN and scheduled nebulizers  -AM CXR shows removal of right chest tube, improving right lower lung atelectasis  -smokes cigars 3x/week, counseled on smoking cessation     Gastrointestinal System:  -No acute issues  -PPI   -diet per surgery  -scheduled feliciano-colace     Endocrine System:  -no acute issues     Renal System:  #Hx BPH  -continue home doxazosin  -follow I/O     Hematological System:  #postoperative anemia, expected  -trend CBC     Infection Disease System:  #Postoperative leukocytosis, expected  -completed post operative ancef     Vascular system & Extremities:  -DVT prophylaxis with lovenox         ICU Check List       ICU Liberation: Intervention:   Assess, Prevent, Manage Pain 5 - Moderate pain    Both SAT and SBT [] SAT  [] SBT 30-60 min [] Extubate to NC  [] Extubate to NIV  [] Discuss Trach   Choice of analgesia and sedation Lew Agitation Sedation Scale (RASS): Alert and calm none     Delirium: Assess, prevent and manage  CAM ICU Negative    Early Mobility and Exercise Proceed with " mobilization - No exclusion criteria met [] PT /OT consult   Family Engagement and Empowerment []Family updated []SW consul     Antimicrobials: ancef 4/22-4/22  Oxygen: 2L NC  Drips: None  Feeding/Fluids: none  Analgesia: multimodal  Sedation: None  Thromboprophylaxis: SCDs and lovenox  Ulcer prophylaxis: PPI   Glycemic control: none    Bowel care: scheduled   Indwelling catheters: none  Lines: PIV  Restraints: none   Dispo: ICU  Code Status: Full     Hardware:         Arterial Line 04/22/25 Left Radial (Active)   Placement Date/Time: 04/22/25 (c) 0750   Size: 20 G  Orientation: Left  Location: Radial  Securement Method: Taped  Patient Tolerance: Tolerated well   Number of days: 0       Social:  Code: Full Code    NOK: PAULA STARKS (Spouse)  [phone] (Mobile)  Disposition: MICU          Maribel Garcia MD   04/23/25 at 7:13 AM   PGY 1 IM    Disclaimer: Documentation completed with the information available at the time of input. The times in the chart may not be reflective of actual patient care times, interventions, or procedures. Documentation occurs after the physical care of the patient.             [1] ascorbic acid, 500 mg, oral, Daily  aspirin, 81 mg, oral, Daily  cyanocobalamin, 1,000 mcg, oral, Daily  doxazosin, 4 mg, oral, Daily  enoxaparin, 40 mg, subcutaneous, Daily  gabapentin, 100 mg, oral, TID  ipratropium-albuteroL, 3 mL, nebulization, TID  magnesium oxide, 400 mg, oral, Daily  melatonin, 5 mg, oral, Nightly  methocarbamol, 500 mg, oral, q8h ROBERT  metoprolol tartrate, 12.5 mg, oral, BID  multivitamin with minerals, 1 tablet, oral, Daily  oxygen, , inhalation, Continuous - Inhalation  sennosides-docusate sodium, 2 tablet, oral, BID  simvastatin, 40 mg, oral, Nightly  [2]    [3] PRN medications: albumin human, calcium chloride, calcium chloride, electrolyte-A, fentaNYL, magnesium sulfate, magnesium sulfate, metoclopramide **OR** metoclopramide, naloxone, ondansetron ODT **OR** ondansetron, oxyCODONE,  oxyCODONE, polyethylene glycol, potassium chloride CR **OR** potassium chloride, potassium chloride CR **OR** potassium chloride, potassium chloride, potassium chloride

## 2025-04-24 ENCOUNTER — PATIENT OUTREACH (OUTPATIENT)
Dept: PRIMARY CARE | Facility: CLINIC | Age: 70
End: 2025-04-24
Payer: MEDICARE

## 2025-04-24 NOTE — PROGRESS NOTES
"Discharge Facility: [facility]  Discharge Diagnosis: Lung cancer  Admission Date: 4/22/2025  Discharge Date: 4/23/2025    PCP Appointment Date: none  Specialist Appointment Date:   -thoracic surgery 5/7/2025    Hospital Encounter and Summary Linked: Yes  Admission (Discharged) with uJnior Beckwith MD (04/22/2025)   See discharge assessment below for further details    Chandana Casillas \"Bebo\" is a 69 y.o. male with a PMH significant for HLD, BPH, and previous nicotine use dosirder who presents to [facility] on 4/22/2025 for a staged right middle lobe wedge vs lobectomy with thoracic surgeon, Dr. Junior Beckwith.     Wrap Up  Wrap Up Additional Comments: CTS spoke with patient. He was admitted to [facility] on 4/22/2025 with lung cancer. Discharged home with no home care on 4/23/2025. Patient stated that he was feeling better. Denies any chest pains or shortness of breath. He stated that the incision looks good. No fevers. No drainage.  PAtient stated that he has not had to take any oxycodone as of yet. Controlling pain with Tylenol. Reviewed medications. Patient denies any issues with obtaining the new medications. Understands discharge instructions. PAtient declined an appt with PCP at this time. Would like to see surgeon on 5/7/2025 before making an appt.Patient voiced no questions or concerns at this time. She does have my contact information if any non-emergent needs arise. (4/24/2025 10:55 AM)  Call End Time: 1102 (4/24/2025 10:55 AM)    Engagement  Call Start Time: 1055 (4/24/2025 10:55 AM)    Medications  Medications reviewed with patient/caregiver?: Yes (4/24/2025 10:55 AM)  Is the patient having any side effects they believe may be caused by any medication additions or changes?: No (4/24/2025 10:55 AM)  Does the patient have all medications ordered at discharge?: Yes (4/24/2025 10:55 AM)  Care Management Interventions: No intervention needed (4/24/2025 10:55 AM)  Prescription " Comments: start : robaxin, lopressor, oxycodone and pericolace (4/24/2025 10:55 AM)  Is the patient taking all medications as directed (includes completed medication regime)?: Yes (4/24/2025 10:55 AM)  Medication Comments: see med list (4/24/2025 10:55 AM)    Appointments  Does the patient have a primary care provider?: Yes (4/24/2025 10:55 AM)  Care Management Interventions: Advised patient to make appointment (4/24/2025 10:55 AM)  Has the patient kept scheduled appointments due by today?: Yes (4/24/2025 10:55 AM)  Care Management Interventions: Advised patient to keep appointment (4/24/2025 10:55 AM)    Self Management  What is the home health agency?: none (4/24/2025 10:55 AM)  Has home health visited the patient within 72 hours of discharge?: Not applicable (4/24/2025 10:55 AM)  What Durable Medical Equipment (DME) was ordered?: n/a (4/24/2025 10:55 AM)    Patient Teaching  Does the patient have access to their discharge instructions?: Yes (4/24/2025 10:55 AM)  Care Management Interventions: Reviewed instructions with patient (4/24/2025 10:55 AM)  What is the patient's perception of their health status since discharge?: Improving (4/24/2025 10:55 AM)  Is the patient/caregiver able to teach back the hierarchy of who to call/visit for symptoms/problems? PCP, Specialist, Home Health nurse, Urgent Care, ED, 911: Yes (4/24/2025 10:55 AM)  Patient/Caregiver Education Comments: see wrap up (4/24/2025 10:55 AM)

## 2025-05-01 NOTE — PROGRESS NOTES
"Chad Casillas  is a 69 y.o. male who presents for evaluation of right middle lobe lung nodule status post Robotic RML lobectomy on 4/22/25.    This patient has been receiving CT screening for lung cancer. A CT scan performed on 1/17/2024 showed a groundglass opacity in the right middle lobe measuring 14 mm in size.  He received a follow-up scan on 2/20/2025 which showed a solid component measuring 7 mm with in this groundglass lesion.  I recommended a PET/CT which was concerning for early primary lung cancer.  I recommended a CT-guided biopsy.  This was performed on 4/1/2025 and consistent with adenocarcinoma which was TTF-1 positive.  I recommended surgical resection and he was taken to the operating room on 4/22/2025 for a right middle lobe lobectomy and was discharged on 4/23/2025.  Pathology results showed pT1c N0 lung cancer resected with negative margins    Currently the patient is presenting for post-operative evaluation. \"Pain was not that bad the first week\" and now is \"really good\". Breathing \"has been good'.  He denies the following symptoms: chest pain, shortness of breath at rest, shortness of breath with activity, cough, hemoptysis, fevers, chills, weight loss, and wound complications.      There have been no significant changes to their documented medical, surgical and family history.     He  reports that he has quit smoking. His smoking use included cigars. He has never used smokeless tobacco. He reports current alcohol use of about 10.0 standard drinks of alcohol per week. He reports that he does not use drugs.    Objective   Physical Exam  The patient is well-appearing and in no acute distress. The trachea is midline and there is no crepitus. The lungs were clear to auscultation, there was no dullness to percussion, no fremitus or egophony. There was good effort and excursion. The heart had a regular rate and rhythm. The abdomen was soft, nontender and nondistended. The extremities had no " edema. Surgical incisions are healing well.  Diagnostic Studies  I reviewed a post-operative CXR which shows no significant pleural effusion or pneumothorax  I have reviewed a pathology result :  FINAL DIAGNOSIS      A.  LYMPH NODE, LEVEL 9, EXCISION:  - Fibroadipose tissue with with no evidence of carcinoma.     B.  LYMPH NODE, LEVEL 8, EXCISION:  - One lymph node with no evidence of carcinoma (0/1).     C.  LYMPH NODE, LEVEL 7, EXCISION:  - One lymph node with no evidence of carcinoma (0/1).     D.  LYMPH NODE, LEVEL 10, EXCISION:  - One lymph node with no evidence of carcinoma (0/1).     E.  LYMPH NODE, LEVEL 4, EXCISION:  - One lymph node with no evidence of carcinoma (0/1).     F.  LYMPH NODE, LEVEL 2, EXCISION:  - One lymph node with no evidence of carcinoma (0/1).     G.  LUNG, RIGHT MIDDLE LOBE, LOBECTOMY:  - Adenocarcinoma, papillary and micropapillary pattern (2.2 cm), involving lung parenchyma. See synoptic report.   - Two lymph nodes with no evidence of carcinoma (0/2).      H.  LYMPH NODE, LEVEL 11, EXCISION:  - One lymph node with no evidence of carcinoma (0/1).     I.  LYMPH NODE, LEVEL 11 RI, EXCISION:  - One lymph node with no evidence of carcinoma (0/1).      Electronically signed by Kaykay Bella DO on 5/5/2025 at [address] [place] [address]   By the signature on this report, the individual or group listed as making the Final Interpretation/Diagnosis certifies that they have reviewed this case.    Case Summary Report   LUNG   8th Edition - Protocol posted: 9/21/2022LUNG - All Specimens  SPECIMEN   Procedure  Lobectomy   Specimen Laterality  Right   TUMOR   Tumor Focality  Single focus   Tumor Site  Middle lobe of lung   Tumor Size     Total Tumor Size (size of entire tumor)  Greatest Dimension (Centimeters): 2.2 cm   Histologic Type  Invasive papillary adenocarcinoma   Histologic Patterns Present  Acinar: 10     Papillary: 60     Micropapillary: 30   Histologic Grade  G3, poorly differentiated   Spread  Through Air Spaces (INDY)  Not identified   Visceral Pleura Invasion  Not identified   Direct Invasion of Adjacent Structures  Not applicable (no adjacent structures present)   Treatment Effect  No known presurgical therapy   Lymphovascular Invasion  Not identified   MARGINS   Margin Status for Invasive Carcinoma  All margins negative for invasive carcinoma   Closest Margin(s) to Invasive Carcinoma  Parenchymal   Distance from Invasive Carcinoma to Closest Margin  0.3 cm   Margin Status for Non-Invasive Tumor  Not applicable   REGIONAL LYMPH NODES   Lymph Node(s) from Prior Procedures  No known prior lymph node sampling performed   Regional Lymph Node Status  All regional lymph nodes negative for tumor   Number of Lymph Nodes Examined  At least: 9   Rukhsana Site(s) Examined  2R: Upper paratracheal     4R: Lower paratracheal     8R: Para-esophageal (below katlyn)     10R: Hilar     11R: Interlobar     12R: Lobar     7: Subcarinal   PATHOLOGIC STAGE CLASSIFICATION (pTNM, AJCC 8th Edition)   Reporting of pT, pN, and (when applicable) pM categories is based on information available to the pathologist at the time the report is issued. As per the AJCC (Chapter 1, 8th Ed.) it is the managing physician’s responsibility to establish the final pathologic stage based upon all pertinent information, including but potentially not limited to this pathology report.   pT Category  pT1c   pN Category  pN0   .        Assessment/Plan   I believe that the patient is recovering appropriately from their recent surgery.     The patient is recently status post surgical intervention.  I believe they are recovering appropriately.  Their clinical and/or radiographic presentation suggest no evidence of postoperative complications.  Based on the patient's pathologic stage, I recommend that they receive no additional treatment at this time.  I would recommend radiographic surveillance of early stage lung cancer with radiographic imaging performed  every 6 months for 2 years and yearly after this.    I recommend CT chest in 6 months    I discussed this in detail with the patient, including a discussion of alternatives. They were comfortable with this approach.     Junior Beckwith MD  [phone]

## 2025-05-02 ENCOUNTER — TELEPHONE (OUTPATIENT)
Dept: CRITICAL CARE MEDICINE | Facility: HOSPITAL | Age: 70
End: 2025-05-02
Payer: MEDICARE

## 2025-05-05 LAB
LAB AP BLOCK FOR ADDITIONAL STUDIES: NORMAL
LABORATORY COMMENT REPORT: NORMAL
PATH REPORT.FINAL DX SPEC: NORMAL
PATH REPORT.GROSS SPEC: NORMAL
PATH REPORT.RELEVANT HX SPEC: NORMAL
PATH REPORT.TOTAL CANCER: NORMAL
PATHOLOGY SYNOPTIC REPORT: NORMAL

## 2025-05-06 ENCOUNTER — TELEPHONE (OUTPATIENT)
Dept: SURGERY | Facility: CLINIC | Age: 70
End: 2025-05-06
Payer: MEDICARE

## 2025-05-06 NOTE — TELEPHONE ENCOUNTER
Voicemail left for patient regarding his upcoming post op appointment.  Patient reminded that he needs to have a chest x ray prior to his appointment.  Patient told to go to [facility] radiology 1 hr prior to appointment for xray.  Address to radiology department left on voicemail.  Office number left should patient have any further questions

## 2025-05-07 ENCOUNTER — OFFICE VISIT (OUTPATIENT)
Dept: SURGERY | Facility: CLINIC | Age: 70
End: 2025-05-07
Payer: MEDICARE

## 2025-05-07 ENCOUNTER — TUMOR BOARD CONFERENCE (OUTPATIENT)
Dept: HEMATOLOGY/ONCOLOGY | Facility: HOSPITAL | Age: 70
End: 2025-05-07
Payer: MEDICARE

## 2025-05-07 ENCOUNTER — PATIENT OUTREACH (OUTPATIENT)
Dept: PRIMARY CARE | Facility: CLINIC | Age: 70
End: 2025-05-07

## 2025-05-07 ENCOUNTER — HOSPITAL ENCOUNTER (OUTPATIENT)
Dept: RADIOLOGY | Facility: HOSPITAL | Age: 70
Discharge: HOME | End: 2025-05-07
Payer: MEDICARE

## 2025-05-07 VITALS
OXYGEN SATURATION: 97 % | TEMPERATURE: 98.4 F | DIASTOLIC BLOOD PRESSURE: 77 MMHG | HEIGHT: 71 IN | BODY MASS INDEX: 26.35 KG/M2 | HEART RATE: 84 BPM | SYSTOLIC BLOOD PRESSURE: 123 MMHG | WEIGHT: 188.2 LBS

## 2025-05-07 DIAGNOSIS — C34.2 MALIGNANT NEOPLASM OF MIDDLE LOBE OF RIGHT LUNG (MULTI): ICD-10-CM

## 2025-05-07 DIAGNOSIS — C34.90 MALIGNANT NEOPLASM OF LUNG, UNSPECIFIED LATERALITY, UNSPECIFIED PART OF LUNG (MULTI): ICD-10-CM

## 2025-05-07 PROCEDURE — 71046 X-RAY EXAM CHEST 2 VIEWS: CPT | Performed by: RADIOLOGY

## 2025-05-07 PROCEDURE — 99211 OFF/OP EST MAY X REQ PHY/QHP: CPT | Mod: 25 | Performed by: THORACIC SURGERY (CARDIOTHORACIC VASCULAR SURGERY)

## 2025-05-07 PROCEDURE — 1126F AMNT PAIN NOTED NONE PRSNT: CPT | Performed by: THORACIC SURGERY (CARDIOTHORACIC VASCULAR SURGERY)

## 2025-05-07 PROCEDURE — 3008F BODY MASS INDEX DOCD: CPT | Performed by: THORACIC SURGERY (CARDIOTHORACIC VASCULAR SURGERY)

## 2025-05-07 PROCEDURE — 1111F DSCHRG MED/CURRENT MED MERGE: CPT | Performed by: THORACIC SURGERY (CARDIOTHORACIC VASCULAR SURGERY)

## 2025-05-07 PROCEDURE — 71046 X-RAY EXAM CHEST 2 VIEWS: CPT

## 2025-05-07 PROCEDURE — 1159F MED LIST DOCD IN RCRD: CPT | Performed by: THORACIC SURGERY (CARDIOTHORACIC VASCULAR SURGERY)

## 2025-05-07 ASSESSMENT — ENCOUNTER SYMPTOMS
DEPRESSION: 0
LOSS OF SENSATION IN FEET: 0
OCCASIONAL FEELINGS OF UNSTEADINESS: 0

## 2025-05-07 ASSESSMENT — PAIN SCALES - GENERAL: PAINLEVEL_OUTOF10: 0-NO PAIN

## 2025-05-08 NOTE — TUMOR BOARD NOTE
Tumor Board Documentation    Bebo Casillas was presented by Junior Beckwith MD, at our Tumor Board on 5/7/2025, which included representatives from Medical oncology, Radiation oncology, Surgical oncology, Radiology, Pathology.    Bebo currently presents as Follow up discussion after surgery with history of the following treatments:  4/22/25 - RML Lobectomy, 2.2 cm tumor, nine lymph nodes neg for mets (0/9), neg margins.  pT1c, pN0, cM0 - Stage IA3    Additionally, we reviewed previous medical and familial history, history of present illness, and recent lab results along with all available histopathologic and imaging studies. The tumor board considered available treatment options and made the following recommendations:  Observation, surveillance    The following procedures/referrals were also placed: No orders of the defined types were placed in this encounter.      Clinical Trial Status: Not discussed     National site-specific guidelines were discussed with respect to the case.

## 2025-06-03 ENCOUNTER — APPOINTMENT (OUTPATIENT)
Dept: PRIMARY CARE | Facility: CLINIC | Age: 70
End: 2025-06-03
Payer: MEDICARE

## 2025-06-03 VITALS
BODY MASS INDEX: 26.21 KG/M2 | OXYGEN SATURATION: 97 % | HEART RATE: 67 BPM | DIASTOLIC BLOOD PRESSURE: 62 MMHG | WEIGHT: 187.9 LBS | SYSTOLIC BLOOD PRESSURE: 130 MMHG | RESPIRATION RATE: 18 BRPM

## 2025-06-03 DIAGNOSIS — I25.10 CORONARY ARTERY DISEASE INVOLVING NATIVE CORONARY ARTERY OF NATIVE HEART WITHOUT ANGINA PECTORIS: ICD-10-CM

## 2025-06-03 DIAGNOSIS — R05.8 DRY COUGH: ICD-10-CM

## 2025-06-03 DIAGNOSIS — R03.0 ELEVATED BLOOD PRESSURE READING: Primary | ICD-10-CM

## 2025-06-03 PROCEDURE — 1159F MED LIST DOCD IN RCRD: CPT | Performed by: INTERNAL MEDICINE

## 2025-06-03 PROCEDURE — 99213 OFFICE O/P EST LOW 20 MIN: CPT | Performed by: INTERNAL MEDICINE

## 2025-06-03 ASSESSMENT — ENCOUNTER SYMPTOMS
COUGH: 1
SHORTNESS OF BREATH: 0

## 2025-06-03 NOTE — PROGRESS NOTES
"Subjective   Patient ID: Chandana Casillas \"Bebo\" is a 69 y.o. male who presents for Follow-up.    Pt here for follow up.    Recovering well from surgery. Denies SOB, chest pain, etc.    Has had a dry cough since the surgery. Told by Dr. Beckwith that this tends to be the case. Denies SOB or pleuritic chest pain.        Review of Systems   Respiratory:  Positive for cough. Negative for shortness of breath.    Cardiovascular:  Negative for chest pain.       /62   Pulse 67   Resp 18   Wt 85.2 kg (187 lb 14.4 oz)   SpO2 97%   BMI 26.21 kg/m²   Objective   Physical Exam  Constitutional:       General: He is not in acute distress.     Appearance: He is not ill-appearing, toxic-appearing or diaphoretic.   HENT:      Head: Normocephalic and atraumatic.   Eyes:      Conjunctiva/sclera: Conjunctivae normal.   Cardiovascular:      Rate and Rhythm: Normal rate and regular rhythm.      Heart sounds: No murmur heard.     No friction rub. No gallop.   Pulmonary:      Effort: Pulmonary effort is normal. No respiratory distress.      Breath sounds: No stridor. No wheezing, rhonchi or rales.   Neurological:      Mental Status: He is alert.         Assessment/Plan   Problem List Items Addressed This Visit           ICD-10-CM    CAD (coronary artery disease) I25.10     Other Visit Diagnoses         Codes      Elevated blood pressure reading    -  Primary R03.0      Dry cough     R05.8        -Repeat blood pressure reading improved. Pt no longer taking metoprolol as instructed. Can stay off medication.  -Cough likely related to recovery of lungs. Will see how this changes over the rest of the year. To get repeat CT lungs every 6 months for 2 years per Dr. Beckwith's recommendation. Pt cannot speak highly enough of the care he received from Dr. Beckwith and his staff.  -Pt to gradually increase exercise tolerance as able.  -Reviewed colon cancer screening. Last colonoscopy in 2023 at [place]; told to repeat in 5 years.  -Is urinating " more at night. Will see if reduction of water intake before bedtime helps. Could be because benadryl was stopped.  -Pt wondering about coronary calcifications noted on CT lung from February; I reviewed that this has been seen on all of his CT lungs in the past. Is on statin and LDL is under 70. If he starts getting SOB/chest pain to let us know so we can work up quickly. Pt agreeable.         Cali Menendez MD 06/03/25 3:12 PM

## 2025-06-17 DIAGNOSIS — L30.9 ECZEMA, UNSPECIFIED TYPE: ICD-10-CM

## 2025-06-17 RX ORDER — HYDROCORTISONE 25 MG/G
CREAM TOPICAL 2 TIMES DAILY
Qty: 28 G | Refills: 3 | Status: SHIPPED | OUTPATIENT
Start: 2025-06-17

## 2025-06-30 ENCOUNTER — APPOINTMENT (OUTPATIENT)
Age: 70
End: 2025-06-30
Payer: MEDICARE

## 2025-06-30 DIAGNOSIS — R33.8 ACUTE URINARY RETENTION: Primary | ICD-10-CM

## 2025-06-30 NOTE — PROGRESS NOTES
[facility]  Outpatient Clinic Note    ***INITIAL EVALUATION***  ***FOLLOW-UP VISIT***    HISTORY OF PRESENT ILLNESS:   Chandana Casillas is a 69 y.o. male who arrives today for ***    AUA-SS: {0-35:51952}     {symptoms; aua:04407}  ALICIA: {0-35:14054}    Subjective   PAST MEDICAL HISTORY:  Medical History[1]    PAST SURGICAL HISTORY:  Surgical History[2]     ALLERGIES:   Allergies[3]     MEDICATIONS:   Medications Ordered Prior to Encounter[4]     SOCIAL HISTORY:  Patient  reports that he has quit smoking. His smoking use included cigars. He has never used smokeless tobacco. He reports current alcohol use of about 10.0 standard drinks of alcohol per week. He reports that he does not use drugs.   Social History     Socioeconomic History    Marital status:      Spouse name: Not on file    Number of children: Not on file    Years of education: Not on file    Highest education level: Not on file   Occupational History    Not on file   Tobacco Use    Smoking status: Former     Types: Cigars    Smokeless tobacco: Never   Substance and Sexual Activity    Alcohol use: Yes     Alcohol/week: 10.0 standard drinks of alcohol     Types: 10 Standard drinks or equivalent per week     Comment: bourbon    Drug use: Never    Sexual activity: Not on file   Other Topics Concern    Not on file   Social History Narrative    Not on file     Social Drivers of Health     Financial Resource Strain: Low Risk  (4/22/2025)    Overall Financial Resource Strain (CARDIA)     Difficulty of Paying Living Expenses: Not hard at all   Food Insecurity: No Food Insecurity (4/22/2025)    Hunger Vital Sign     Worried About Running Out of Food in the Last Year: Never true     Ran Out of Food in the Last Year: Never true   Transportation Needs: No Transportation Needs (4/22/2025)    PRAPARE - Transportation     Lack of Transportation (Medical): No     Lack of Transportation (Non-Medical): No   Physical Activity: Insufficiently Active (4/22/2025)     Exercise Vital Sign     Days of Exercise per Week: 3 days     Minutes of Exercise per Session: 30 min   Stress: Not on file   Social Connections: Not on file   Intimate Partner Violence: Not At Risk (4/22/2025)    Humiliation, Afraid, Rape, and Kick questionnaire     Fear of Current or Ex-Partner: No     Emotionally Abused: No     Physically Abused: No     Sexually Abused: No   Housing Stability: Low Risk  (4/22/2025)    Housing Stability Vital Sign     Unable to Pay for Housing in the Last Year: No     Number of Times Moved in the Last Year: 0     Homeless in the Last Year: No       FAMILY HISTORY:  Family History[5]    REVIEW OF SYSTEMS:  All systems reviewed. Anything negative noted in the HPI.    Objective   PHYSICAL EXAM:  There were no vitals taken for this visit.  Constitutional: Appearance: Well-developed.  HENT: Head: Normocephalic and atraumatic.  Neck: Normal range of motion.  Pulmonary: Pulmonary effort is normal.  Musculoskeletal: Normal range of motion.  Skin: General: Skin is warm and dry.  Neurological: Alert and oriented to person, place, and time.  Psychiatric: Normal mood and affect. Thought content normal.      PATHOLOGY REVIEW:  ***    RADIOLOGY REVIEW:  ***    LABORATORY REVIEW:   Lab Results   Component Value Date    BUN 10 04/23/2025    CREATININE 0.75 04/23/2025    EGFR >90 04/23/2025     04/23/2025    K 4.0 04/23/2025     04/23/2025    CO2 32 04/23/2025    CALCIUM 8.7 04/23/2025    HGBA1C 5.6 01/11/2024      Lab Results   Component Value Date    WBC 7.5 04/23/2025    RBC 4.33 (L) 04/23/2025    HGB 12.7 (L) 04/23/2025    HCT 40.8 (L) 04/23/2025    MCV 94 04/23/2025    MCH 29.3 04/23/2025    MCHC 31.1 (L) 04/23/2025    RDW 13.0 04/23/2025     04/23/2025    MPV 10.1 02/04/2025        Lab Results   Component Value Date    PSA 4.69 (H) 03/26/2025    PSA 7.1 (H) 02/04/2025    PSA 4.78 (H) 07/24/2024    PSA 4.66 (H) 01/10/2023    PSA 3.52 08/05/2020    PSAFREE 1.6 02/04/2025  "       *** Urine dipstick shows {ua dip:789747}.  Micro exam: {urine micro:848358}.  Lab Results   Component Value Date    COLORU Yellow 03/26/2025    APPEARANCEU Clear 03/26/2025    SPECGRAVU 1.020 03/26/2025    MADINA 5.5 03/26/2025    PROTUR NEGATIVE 03/26/2025    GLUCOSEU NEGATIVE 03/26/2025    KETONESU NEGATIVE 03/26/2025    BLOODU NEGATIVE 03/26/2025    BILIRUBINU NEGATIVE 03/26/2025    UROBILINOGEN 0.2 03/26/2025    UROBILINOGEN NEGATIVE 03/26/2025    NITRITEU NEGATIVE 03/26/2025    LEUKOCYTESU NEGATIVE 01/11/2024     No results found for: \"URINECULTURE\", \"AFBCXUR\"  Assessment/Plan   Assessment & Plan:     Problem List Items Addressed This Visit    None    Chandana Casillas is a 69 y.o. with ***    Plan:  ***    All questions and concerns were addressed. Patient verbalizes understanding and has no other questions at this time.   You are able to access your chart online. You can sign into [employer] or add the [employer] polina on your smart phone to review today's visit, laboratory work and imaging.     Rony López, [facility]  [facility] [place]  Office Phone: [phone]       [1]   Past Medical History:  Diagnosis Date    Benign prostatic hyperplasia 02/21/2025    Elevated low density lipoprotein (LDL) cholesterol level 02/21/2025    Herpes zoster 02/21/2025    Lung cancer (Multi)     right middle    Occlusion of carotid artery 02/21/2025    Overweight with body mass index (BMI) 25.0-29.9 02/21/2025    Personal history of colonic polyps 01/05/2017    History of colonic polyps    Personal history of other diseases of the circulatory system     History of coronary atherosclerosis    Personal history of other endocrine, nutritional and metabolic disease     History of hypercholesterolemia    Vision loss    [2]   Past Surgical History:  Procedure Laterality Date    CARDIAC CATHETERIZATION      COLONOSCOPY      LUNG LOBECTOMY Right 04/22/2025    Robotic RML   [3] No Known Allergies  [4]   Current Outpatient " Medications on File Prior to Visit   Medication Sig Dispense Refill    ascorbic acid (Vitamin C) 500 mg tablet Take 1 tablet (500 mg) by mouth once daily.      aspirin 81 mg EC tablet       cyanocobalamin (Vitamin B-12) 1,000 mcg tablet Take 1 tablet (1,000 mcg) by mouth once daily.      doxazosin (Cardura) 4 mg tablet TAKE 1 TABLET BY MOUTH ONCE DAILY 90 tablet 1    hydrocortisone 2.5 % cream Apply topically 2 times a day. 28 g 3    melatonin 5 mg capsule Take 1 capsule (5 mg) by mouth once daily at bedtime.      multivitamin with minerals iron-free (Centrum Silver) Take 1 tablet by mouth once daily.      simvastatin (Zocor) 80 mg tablet TAKE 1 TABLET BY MOUTH ONCE DAILY 90 tablet 1    vitamin D3-folic acid 250 mcg (10,000 unit)-1 mg tablet Take 1 tablet by mouth once daily.       No current facility-administered medications on file prior to visit.   [5]   Family History  Problem Relation Name Age of Onset    Colon cancer Mother          Diagnosed in her 80s.    Brainstem hemorrhage (Multi) Father      Hypertension Father

## 2025-07-01 NOTE — PROGRESS NOTES
"Subjective   Patient ID: Chandana Casillas \"Bebo\" is a 69 y.o. male who presents for A F/U AFTER HAVING HIS ISAACS REMOVED THIS AM.  PT WENT INTO  URINARY RETENTION ON 6-28-25-PVR WAS 2300 ML  . PT LAST SEEN BY ME ON 3-26-25 FOR AN ELEVATED PSA.  PROSTATE AT THAT TIME WAS 3+ IN SIZE.  SINCE REMOVING THE ISAACS THIS AM HE HAS BEEN VOIDING.     HPI:  Are you experiencing:  Burning on urination --NO  Pain on urination  --NO  Urinary frequency -- NO  Urinary urgency --NO  Urge incontinence --NO  Urinary stress incontinence  -- NO  Number of pads used per day --NONE  Hematuria --NO  Hesitancy -- NO  Post void fullness -- NO    PSA:  3/26/2025--4.69  2/4/2025--7.1--Free PSA 23%  7/24/2024--4.78  1/11/2024--4.38  1/10/2023--4.66  1/6/2022--3.4  1/14/2021--4.58  1/28/2020--4.95  1/28/2019--3.66  1/17/2018--3.61     PVR-- 181 ML     ASSESSMENT / PLAN  A:  REMOTE H/O URINARY RETENTION  ISAACS REMOVED THIS AM -- HE IS VOIDING OK SO FAR    VARIABLE , ELEVATED PSA - NORMAL FEELING BUT ENLARGED PROSTATE ( ELEVATED PSA COULD BE SECONDARY TO THE LARGE SIZE OF HIS PROSTATE AND NOT A MALIGNANCY )  NO FAMILY H/O PROSTATE CANCER  PT WAS NOT IN [place] [facility]  PATHOPHYSIOLOGY OF  THE ABOVE AND OPTIONS OF FURTHER EVALUATION RE-DISCUSSED IN DETAIL  ALL QUESTIONS ANSWERED      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 -- PT S/P EXCISION OF THE LUNG LESION ON 4-22-25    P:  CONTINUE:  FLOMAX 0.4 MG / DAY  F/U NEXT MONDAY TO RE CK A PVR OR TOMORROW IN THE [place] OFFICE IF HE IS HAVING TROUBLE  Raymond Hernandez MD 07/01/25 11:57 AM   7-3-25  ADDENDUM:  PT WENT INTO URINARY RETENTION LAST NIGHT.  HE HAD A ISAACS PLACED IN THE ER  P:  F/U ON MONDAY FOR A TRUSP , CYSTO TO DETERMINE THE CAUSE OF HIS URINARY RETENTION  FURTHER REC TO FOLLOW   "

## 2025-07-02 ENCOUNTER — OFFICE VISIT (OUTPATIENT)
Age: 70
End: 2025-07-02
Payer: MEDICARE

## 2025-07-02 VITALS — TEMPERATURE: 98.1 F | HEART RATE: 82 BPM | SYSTOLIC BLOOD PRESSURE: 147 MMHG | DIASTOLIC BLOOD PRESSURE: 69 MMHG

## 2025-07-02 DIAGNOSIS — R33.9 URINARY RETENTION: ICD-10-CM

## 2025-07-02 PROCEDURE — 99213 OFFICE O/P EST LOW 20 MIN: CPT | Performed by: UROLOGY

## 2025-07-02 PROCEDURE — 51798 US URINE CAPACITY MEASURE: CPT | Performed by: UROLOGY

## 2025-07-02 PROCEDURE — 1160F RVW MEDS BY RX/DR IN RCRD: CPT | Performed by: UROLOGY

## 2025-07-02 PROCEDURE — 1159F MED LIST DOCD IN RCRD: CPT | Performed by: UROLOGY

## 2025-07-02 RX ORDER — TAMSULOSIN HYDROCHLORIDE 0.4 MG/1
0.4 CAPSULE ORAL
COMMUNITY
Start: 2025-06-28 | End: 2025-07-28

## 2025-07-02 NOTE — LETTER
"July 4, 2025     Cali Menendez MD  [address] [facility]  [address]  [facility] [address]    Patient: Bebo Casillas   YOB: 1955   Date of Visit: 7/2/2025       Dear Dr. Cali Menendez MD:    Thank you for referring Bebo Casillas to me for evaluation. Below are my notes for this consultation.  If you have questions, please do not hesitate to call me. I look forward to following your patient along with you.       Sincerely,     Raymond Hernandez MD      CC: No Recipients  ______________________________________________________________________________________    Subjective  Patient ID: Chandana Casillas \"Mitesh" is a 69 y.o. male who presents for A F/U AFTER HAVING HIS ISAACS REMOVED THIS AM.  PT WENT INTO  URINARY RETENTION ON 6-28-25-PVR WAS 2300 ML  . PT LAST SEEN BY ME ON 3-26-25 FOR AN ELEVATED PSA.  PROSTATE AT THAT TIME WAS 3+ IN SIZE.  SINCE REMOVING THE ISAACS THIS AM HE HAS BEEN VOIDING.     HPI:  Are you experiencing:  Burning on urination --NO  Pain on urination  --NO  Urinary frequency -- NO  Urinary urgency --NO  Urge incontinence --NO  Urinary stress incontinence  -- NO  Number of pads used per day --NONE  Hematuria --NO  Hesitancy -- NO  Post void fullness -- NO    PSA:  3/26/2025--4.69  2/4/2025--7.1--Free PSA 23%  7/24/2024--4.78  1/11/2024--4.38  1/10/2023--4.66  1/6/2022--3.4  1/14/2021--4.58  1/28/2020--4.95  1/28/2019--3.66  1/17/2018--3.61     PVR-- 181 ML     ASSESSMENT / PLAN  A:  REMOTE H/O URINARY RETENTION  ISAACS REMOVED THIS AM -- HE IS VOIDING OK SO FAR    VARIABLE , ELEVATED PSA - NORMAL FEELING BUT ENLARGED PROSTATE ( ELEVATED PSA COULD BE SECONDARY TO THE LARGE SIZE OF HIS PROSTATE AND NOT A MALIGNANCY )  NO FAMILY H/O PROSTATE CANCER  PT WAS NOT IN [facility]  PATHOPHYSIOLOGY OF  THE ABOVE AND OPTIONS OF FURTHER EVALUATION RE-DISCUSSED IN DETAIL  ALL QUESTIONS ANSWERED      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 -- PT S/P EXCISION OF THE " LUNG LESION ON 4-22-25    P:  CONTINUE:  FLOMAX 0.4 MG / DAY  F/U NEXT MONDAY TO RE CK A PVR OR TOMORROW IN THE [place] OFFICE IF HE IS HAVING TROUBLE  Raymond Hernandez MD 07/01/25 11:57 AM   7-3-25  ADDENDUM:  PT WENT INTO URINARY RETENTION LAST NIGHT.  HE HAD A ISAACS PLACED IN THE ER  P:  F/U ON MONDAY FOR A TRUSP , CYSTO TO DETERMINE THE CAUSE OF HIS URINARY RETENTION  FURTHER REC TO FOLLOW

## 2025-07-05 DIAGNOSIS — E78.00 ELEVATED LDL CHOLESTEROL LEVEL: ICD-10-CM

## 2025-07-06 NOTE — PROGRESS NOTES
"Patient ID: Chandana Casillas \"Bebo\" is a 69 y.o. male. PRESENTS FOR A CYSTO, TRUPS TO DEFINE THE CAUSE OF HIS URINARY RETENTION.    PROCEDURE:; CYSTOSCOPY   PRE OP:  H/O URINARY RETENTION   POST OP --  SURGEON -- EDWIN HEAD:  1 % LIDOCAINE  FINDINGS:  After informed consent was obtained, the patient was taken to the procedure room for cystoscopy due to A H/O     Cystoscopy     Procedure Note:    A sterile prep and drape was performed in standard fashion. Lidocaine was used for topical anesthesia. A flexible cystoscope was inserted into the urethra without difficulty revealing normal urethra.     The prostate ***     Then entered the bladder revealing bladder mucosa with no erythematous patches or plaques, foreign bodies, stones or papillary lesions. The ureteral orifices were visualized bilaterally. These were orthotopic in location and effluxing clear urine. No masses were seen on retroflexion.     Post-Procedure:   The cystoscope was removed. The vital signs were stable . The patient tolerated the procedure well. There were no complications.     BRIEF OP NOTE:  Procedure  TRUSP BX  PRE OP DX -- URINARY RETENTION  POST OP DX --  SURGEON -- EDWIN HEAD-- 1% LIDOCAINE-- 10 ML  FINDINGS:  HEIGHT--  WIDTH --   LENGTH--   PROSTATE SIZE --  HYPOECHOIC AREAS--    Assessment/Plan   A:    P:  "

## 2025-07-07 ENCOUNTER — APPOINTMENT (OUTPATIENT)
Age: 70
End: 2025-07-07
Payer: MEDICARE

## 2025-07-07 VITALS — TEMPERATURE: 98.6 F | SYSTOLIC BLOOD PRESSURE: 134 MMHG | HEART RATE: 85 BPM | DIASTOLIC BLOOD PRESSURE: 60 MMHG

## 2025-07-07 DIAGNOSIS — R39.14 BENIGN PROSTATIC HYPERPLASIA WITH INCOMPLETE BLADDER EMPTYING: Primary | ICD-10-CM

## 2025-07-07 DIAGNOSIS — C61 PROSTATE CANCER (MULTI): ICD-10-CM

## 2025-07-07 DIAGNOSIS — R33.9 URINARY RETENTION: ICD-10-CM

## 2025-07-07 DIAGNOSIS — N40.1 BENIGN PROSTATIC HYPERPLASIA WITH INCOMPLETE BLADDER EMPTYING: Primary | ICD-10-CM

## 2025-07-07 PROCEDURE — 76942 ECHO GUIDE FOR BIOPSY: CPT | Performed by: UROLOGY

## 2025-07-07 PROCEDURE — 76872 US TRANSRECTAL: CPT | Performed by: UROLOGY

## 2025-07-07 PROCEDURE — 52000 CYSTOURETHROSCOPY: CPT | Performed by: UROLOGY

## 2025-07-07 PROCEDURE — 99213 OFFICE O/P EST LOW 20 MIN: CPT | Performed by: UROLOGY

## 2025-07-07 RX ORDER — NITROFURANTOIN 25; 75 MG/1; MG/1
100 CAPSULE ORAL 2 TIMES DAILY
COMMUNITY
Start: 2025-07-03 | End: 2025-07-10

## 2025-07-07 RX ORDER — LIDOCAINE HYDROCHLORIDE 20 MG/ML
1 JELLY TOPICAL ONCE
Status: COMPLETED | OUTPATIENT
Start: 2025-07-07 | End: 2025-07-07

## 2025-07-07 RX ORDER — SIMVASTATIN 80 MG/1
80 TABLET, FILM COATED ORAL DAILY
Qty: 90 TABLET | Refills: 1 | Status: SHIPPED | OUTPATIENT
Start: 2025-07-07

## 2025-07-07 RX ADMIN — LIDOCAINE HYDROCHLORIDE 1 APPLICATION: 20 JELLY TOPICAL at 13:30

## 2025-07-07 NOTE — LETTER
"July 8, 2025     Cali Menendez MD  [address]  [address]  [facility] [address]    Patient: Bebo Casillas   YOB: 1955   Date of Visit: 7/7/2025       Dear Dr. Cali Menendez MD:    Thank you for referring Bebo Casillas to me for evaluation. Below are my notes for this consultation.  If you have questions, please do not hesitate to call me. I look forward to following your patient along with you.       Sincerely,     Raymond Hernandez MD      CC: No Recipients  ______________________________________________________________________________________    Patient ID: Chandana Casillas \"Mitesh" is a 69 y.o. male. PRESENTS FOR A CYSTO, TRUPS TO DEFINE THE CAUSE OF HIS URINARY RETENTION.    PROCEDURE:; CYSTOSCOPY   PRE OP:  H/O URINARY RETENTION   POST OP -- VERY LARGE PROSTATE CAUSING BLADDER OUTLET OBSTRUCTION  SURGEON -- EDWIN CHAUHAN  ANES:  1 % LIDOCAINE  FINDINGS:  After informed consent was obtained, the patient was taken to the procedure room for cystoscopy due to A H/O URINARY RETENTION    Cystoscopy     Procedure Note:    A sterile prep and drape was performed in standard fashion. Lidocaine was used for topical anesthesia. A flexible cystoscope was inserted into the urethra without difficulty revealing normal urethra.     The prostate VERY LARGE - OBSTRUCTING THE BLADDER OUTLET      Then entered the bladder revealing bladder mucosa with no patches or plaques, foreign bodies, stones or papillary lesions. The ureteral orifices were visualized bilaterally. These were orthotopic in location and effluxing clear urine. No masses were seen on retroflexion. FEW AREAS OF SUBMUCOSAL HEMORRHAGE SECONDARY TO THE INDWELLING ISAACS.  MILD TRABECULATION.     Post-Procedure:   The cystoscope was removed. The vital signs were stable . The patient tolerated the procedure well. There were no complications.     BRIEF OP NOTE:  Procedure  TRUSP BX  PRE OP DX -- URINARY RETENTION  POST OP DX --  SURGEON -- EDWIN  ANES-- 1% LIDOCAINE-- " 10 ML  FINDINGS:  HEIGHT--50.3 MM  WIDTH -- 70.6 MM  LENGTH-- 59.6 MM  PROSTATE SIZE -- 111 GMS    #16 FR ISAACS REINSERTED AND LEFT TO CD    Assessment/Plan   A:  BPH CAUSING BLADDER OUTLET OBSTRUCTION  PATHOPHYSIOLOGY  OF THE ABOVE AND OPTIONS OF THERAPY DISCUSSED  IN DETAIL  ALL QUESTIONS ANSWERED   P:  WILL REFER TO SR BERMAN FOR A ? HOLEP PROCEDURE   7-7-25  14:00

## 2025-07-07 NOTE — LETTER
"July 8, 2025     Timo Lambert MD  [facility] Of The [address] [address]    Patient: Bebo Casillas   YOB: 1955   Date of Visit: 7/7/2025       Dear Dr. Timo Lambert MD:    Thank you for referring Bebo Casillas to me for evaluation. Below are my notes for this consultation.  If you have questions, please do not hesitate to call me. I look forward to following your patient along with you.       Sincerely,     Raymond Hernandez MD      CC: No Recipients  ______________________________________________________________________________________    Patient ID: Chandana Casillas \"Mitesh" is a 69 y.o. male. PRESENTS FOR A CYSTO, TRUPS TO DEFINE THE CAUSE OF HIS URINARY RETENTION.    PROCEDURE:; CYSTOSCOPY   PRE OP:  H/O URINARY RETENTION   POST OP -- VERY LARGE PROSTATE CAUSING BLADDER OUTLET OBSTRUCTION  SURGEON -- EDWIN CHAUHAN  ANES:  1 % LIDOCAINE  FINDINGS:  After informed consent was obtained, the patient was taken to the procedure room for cystoscopy due to A H/O URINARY RETENTION    Cystoscopy     Procedure Note:    A sterile prep and drape was performed in standard fashion. Lidocaine was used for topical anesthesia. A flexible cystoscope was inserted into the urethra without difficulty revealing normal urethra.     The prostate VERY LARGE - OBSTRUCTING THE BLADDER OUTLET      Then entered the bladder revealing bladder mucosa with no patches or plaques, foreign bodies, stones or papillary lesions. The ureteral orifices were visualized bilaterally. These were orthotopic in location and effluxing clear urine. No masses were seen on retroflexion. FEW AREAS OF SUBMUCOSAL HEMORRHAGE SECONDARY TO THE INDWELLING ISAACS.  MILD TRABECULATION.     Post-Procedure:   The cystoscope was removed. The vital signs were stable . The patient tolerated the procedure well. There were no complications.     BRIEF OP NOTE:  Procedure  TRUSP BX  PRE OP DX -- URINARY RETENTION  POST OP DX " --  SURGEON -- EDWIN HEAD-- 1% LIDOCAINE-- 10 ML  FINDINGS:  HEIGHT--50.3 MM  WIDTH -- 70.6 MM  LENGTH-- 59.6 MM  PROSTATE SIZE -- 111 GMS    #16 FR ISAACS REINSERTED AND LEFT TO CD    Assessment/Plan   A:  BPH CAUSING BLADDER OUTLET OBSTRUCTION  PATHOPHYSIOLOGY  OF THE ABOVE AND OPTIONS OF THERAPY DISCUSSED  IN DETAIL  ALL QUESTIONS ANSWERED   P:  WILL REFER TO SR BERMAN FOR A ? HOLEP PROCEDURE   7-7-25  14:00

## 2025-07-08 RX ORDER — KETOROLAC TROMETHAMINE 10 MG/1
10 TABLET, FILM COATED ORAL EVERY 6 HOURS PRN
Qty: 10 TABLET | Refills: 0 | Status: SHIPPED | OUTPATIENT
Start: 2025-07-08

## 2025-07-08 RX ORDER — CEPHALEXIN 250 MG/1
250 CAPSULE ORAL 4 TIMES DAILY
Qty: 28 CAPSULE | Refills: 0 | Status: SHIPPED | OUTPATIENT
Start: 2025-07-08 | End: 2025-07-15

## 2025-07-11 ENCOUNTER — TELEPHONE (OUTPATIENT)
Dept: PRIMARY CARE | Facility: CLINIC | Age: 70
End: 2025-07-11
Payer: MEDICARE

## 2025-07-11 NOTE — TELEPHONE ENCOUNTER
Bebo called asking to speak with you, he has an enlarged prostate he is scheduled to see Urologist in September for surgery. He wants to talk to you about the procedure. I tried to get more info he said your his PCP. Wants to speak with you. Do you want to call him, or do you want me to schedule OV?     Please advise

## 2025-07-17 ENCOUNTER — APPOINTMENT (OUTPATIENT)
Dept: PRIMARY CARE | Facility: CLINIC | Age: 70
End: 2025-07-17
Payer: MEDICARE

## 2025-07-17 DIAGNOSIS — R33.9 URINARY RETENTION: Primary | ICD-10-CM

## 2025-07-17 DIAGNOSIS — I79.8 OTHER DISORDERS OF ARTERIES, ARTERIOLES AND CAPILLARIES IN DISEASES CLASSIFIED ELSEWHERE: ICD-10-CM

## 2025-07-17 PROCEDURE — 99213 OFFICE O/P EST LOW 20 MIN: CPT | Performed by: INTERNAL MEDICINE

## 2025-07-17 ASSESSMENT — ENCOUNTER SYMPTOMS
SLEEP DISTURBANCE: 1
DIFFICULTY URINATING: 1
NUMBNESS: 0
CONSTIPATION: 0

## 2025-07-17 NOTE — PROGRESS NOTES
"Subjective   Patient ID: Chandana Casillas \"Bebo\" is a 69 y.o. male who presents for No chief complaint on file..  A few weeks ago had difficulty urinating at night. Thought it was golfing that day and not drinking enough water. Went to [facility] ER and had a acevedo placed and over 2000cc came out. Saw Dr. Hernandez afterwards and had it removed. Was able to urinate again twice, but then that night once again had urinary retention. Went back to the ER and had another acevedo placed.    Told by Dr. Hernandez that his prostate is enlarged, and pt should follow up with Dr. Lambert to do a Holup procedure.    Also was treated for a UTI.        Confirmed patient is physically in [place].    Review of Systems   Gastrointestinal:  Negative for constipation.   Genitourinary:  Positive for difficulty urinating.   Neurological:  Negative for numbness.   Psychiatric/Behavioral:  Positive for sleep disturbance.        Objective   Physical Exam  Constitutional:       General: He is not in acute distress.     Appearance: He is not ill-appearing, toxic-appearing or diaphoretic.     Neurological:      Mental Status: He is alert.         Assessment/Plan   Problem List Items Addressed This Visit    None  Visit Diagnoses         Codes      Urinary retention    -  Primary R33.9      Other disorders of arteries, arterioles and capillaries in diseases classified elsewhere     I79.8    Relevant Orders    Vascular US Carotid Artery Duplex Bilateral        -Pt w/ urinary retention requiring acevedo. Saw Dr. Hernandez for this, w/ plan to see Dr. Lambert next. Is worried about UTI (since resolved) and having a acevedo until he is seen. We discussed how benadryl use may have contributed to presentation w/ retention (both episodes occurred overnight, and pt takes 2 nightly). Is on waiting list; will see if anything else can be done to move pt appt time up.  -Pt states he was told by Dr. Beckwith he has carotid artery disease on imaging. I do not see this mentioned, but it is " documented in his hx. Pt unaware of this. I cannot see scan done to confirm it. Will get one from now.  -Pt to try melatonin and magnesium glycinate for sleep. If no improvement to schedule follow up.       I performed this visit using real-time technology tools, including an audio/video connection between pt's location and my own.    Pt consented for telehealth visit. Understands there may be limitations to visit particularly when unable to perform a physical exam. May not be able to address all issues through this medium.      Cali Menendez MD 07/17/25 12:25 PM

## 2025-07-18 DIAGNOSIS — R31.0 GROSS HEMATURIA: Primary | ICD-10-CM

## 2025-07-20 LAB
APPEARANCE UR: ABNORMAL
BACTERIA #/AREA URNS HPF: ABNORMAL /HPF
BACTERIA UR CULT: ABNORMAL
BACTERIA UR CULT: ABNORMAL
BILIRUB UR QL STRIP: NEGATIVE
COLOR UR: ABNORMAL
GLUCOSE UR QL STRIP: NEGATIVE
HGB UR QL STRIP: ABNORMAL
HYALINE CASTS #/AREA URNS LPF: ABNORMAL /LPF
KETONES UR QL STRIP: NEGATIVE
LEUKOCYTE ESTERASE UR QL STRIP: ABNORMAL
NITRITE UR QL STRIP: NEGATIVE
PH UR STRIP: 7 [PH] (ref 5–8)
PROT UR QL STRIP: ABNORMAL
RBC #/AREA URNS HPF: ABNORMAL /HPF
SERVICE CMNT-IMP: ABNORMAL
SP GR UR STRIP: 1.02 (ref 1–1.03)
SQUAMOUS #/AREA URNS HPF: ABNORMAL /HPF
WBC #/AREA URNS HPF: ABNORMAL /HPF

## 2025-07-21 ENCOUNTER — RESULTS FOLLOW-UP (OUTPATIENT)
Dept: PRIMARY CARE | Facility: CLINIC | Age: 70
End: 2025-07-21
Payer: MEDICARE

## 2025-07-22 ENCOUNTER — HOSPITAL ENCOUNTER (OUTPATIENT)
Dept: RADIOLOGY | Facility: CLINIC | Age: 70
Discharge: HOME | End: 2025-07-22
Payer: MEDICARE

## 2025-07-22 DIAGNOSIS — I79.8 OTHER DISORDERS OF ARTERIES, ARTERIOLES AND CAPILLARIES IN DISEASES CLASSIFIED ELSEWHERE: ICD-10-CM

## 2025-07-22 PROCEDURE — 93880 EXTRACRANIAL BILAT STUDY: CPT | Performed by: RADIOLOGY

## 2025-07-22 PROCEDURE — 93880 EXTRACRANIAL BILAT STUDY: CPT

## 2025-08-12 DIAGNOSIS — N20.0 KIDNEY STONE: ICD-10-CM

## 2025-09-19 ENCOUNTER — APPOINTMENT (OUTPATIENT)
Dept: UROLOGY | Facility: HOSPITAL | Age: 70
End: 2025-09-19
Payer: MEDICARE

## 2026-02-05 ENCOUNTER — APPOINTMENT (OUTPATIENT)
Dept: PRIMARY CARE | Facility: CLINIC | Age: 71
End: 2026-02-05
Payer: MEDICARE

## (undated) DEVICE — ACCESS SYS, KII SHIELDED BLADED, Z-THREAD, 5X100CM

## (undated) DEVICE — DRAPE, TIBURON, SPLIT SHEET, REINF ADH STRIP, 77X122

## (undated) DEVICE — RELOAD, SUREFORM 45, 4.3 GREEN

## (undated) DEVICE — GRASPER, FENESTRATED TIP-UP XI

## (undated) DEVICE — FORCEPS, CADIERE, DAVINCI XI

## (undated) DEVICE — SHEET, SPLIT, CARDIOVASCULAR TIBURON

## (undated) DEVICE — RELOAD, SUREFORM 45, 4.6 BLACK

## (undated) DEVICE — CONNECTOR, 5 IN 1, STERILE

## (undated) DEVICE — PROTECTOR, HEEL/ANKLE/ELBOW, UNIVERSAL

## (undated) DEVICE — ELECTRODE, ELECTROSURGICAL, BLADE, INSULATED, ENT/IMA, STERILE

## (undated) DEVICE — SEAL, UNIVERSAL, 5-12MM

## (undated) DEVICE — ACCESS PORT, 12MM, 120MM LENGTH, LOW PROFILE W/BLADELESS OPTICAL TIP

## (undated) DEVICE — STAPLER, SUREFORM 45, CURVED TIP

## (undated) DEVICE — DRAPE, INCISE, ANTIMICROBIAL, IOBAN 2, LARGE, 17 X 23 IN, DISPOSABLE, STERILE

## (undated) DEVICE — GRASPER, LONG, BIPOLAR, DAVINCI XI

## (undated) DEVICE — DRAPE, COLUMN, DAVINCI XI

## (undated) DEVICE — CAUTERY, PENCIL, PUSH BUTTON, SMOKE EVAC, 70MM

## (undated) DEVICE — CATHETER KIT, RADIAL ARTLINE, 20G X 1 3/4

## (undated) DEVICE — Device

## (undated) DEVICE — TUBING SET, TRI-LUMEN, FILTERED, F/AIRSEAL

## (undated) DEVICE — RETRIEVAL SYSTEM, MONARCH INZII, 12MM

## (undated) DEVICE — SLEEVE, VASO PRESS, CALF GARMENT, MEDIUM, GREEN

## (undated) DEVICE — VESSEL LOOP, RED MAXI, 2 CARD

## (undated) DEVICE — DRESSING, DRAIN SPONGE, EXCILON AMD, 4X4 IN, 6 PLY, ST

## (undated) DEVICE — COLLECTION UNIT, DRAINAGE, THORACIC, SINGLE TUBE, DRY SUCTION, ATS COMPATIBLE, OASIS 3600, LF

## (undated) DEVICE — DRAPE, ARM XI

## (undated) DEVICE — APPLICATOR, CHLORAPREP, W/ORANGE TINT, 26ML

## (undated) DEVICE — OBTURATOR, BLADELESS , SU

## (undated) DEVICE — GAUZE, KITTNER ROLL, STERILE

## (undated) DEVICE — CATHETER, THORACIC, STRAIGHT, ADULT, 28 FR, PVC

## (undated) DEVICE — RELOAD, SUREFORM 45, 2.5 WHITE

## (undated) DEVICE — TUBE, ENDOBRONCHIAL, BRONCHO-CATH, 37 FR, LEFT

## (undated) DEVICE — CLEAN KIT, ANTIFOG SCOPE, SEE SHARP 150MM

## (undated) DEVICE — LUBRICANT, ELECTROLUBE, F/ELECTRODE TIPS

## (undated) DEVICE — ASCOPE 4 SLIM (3.8/1.2), SINGLE USE, STERILE

## (undated) DEVICE — ASSEMBLY, STRYKER FLOW 2, SUCTION IRRIGATOR, WITH TIP

## (undated) DEVICE — DRAPE, SHEET, THREE QUARTER, FAN FOLD, 57 X 77 IN